# Patient Record
Sex: MALE | Race: WHITE | NOT HISPANIC OR LATINO | Employment: OTHER | URBAN - METROPOLITAN AREA
[De-identification: names, ages, dates, MRNs, and addresses within clinical notes are randomized per-mention and may not be internally consistent; named-entity substitution may affect disease eponyms.]

---

## 2017-11-23 ENCOUNTER — HOSPITAL ENCOUNTER (INPATIENT)
Facility: HOSPITAL | Age: 82
LOS: 2 days | Discharge: HOME HEALTH | DRG: 392 | End: 2017-11-25
Attending: EMERGENCY MEDICINE | Admitting: INTERNAL MEDICINE
Payer: MEDICARE

## 2017-11-23 ENCOUNTER — APPOINTMENT (EMERGENCY)
Dept: RADIOLOGY | Facility: HOSPITAL | Age: 82
DRG: 392 | End: 2017-11-23
Payer: MEDICARE

## 2017-11-23 DIAGNOSIS — K52.9 GASTROENTERITIS: Primary | ICD-10-CM
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
## 2017-11-23 DIAGNOSIS — E86.0 DEHYDRATION: ICD-10-CM

## 2017-11-23 PROBLEM — R50.9 FEVER: Status: ACTIVE | Noted: 2017-11-23

## 2017-11-23 PROBLEM — N17.9 ARF (ACUTE RENAL FAILURE) (HCC): Status: ACTIVE | Noted: 2017-11-23

## 2017-11-23 LAB
ALBUMIN SERPL BCP-MCNC: 3.2 G/DL (ref 3.5–5)
ALP SERPL-CCNC: 65 U/L (ref 46–116)
ALT SERPL W P-5'-P-CCNC: 15 U/L (ref 12–78)
ANION GAP SERPL CALCULATED.3IONS-SCNC: 9 MMOL/L (ref 4–13)
APTT PPP: 29 SECONDS (ref 23–35)
AST SERPL W P-5'-P-CCNC: 18 U/L (ref 5–45)
BACTERIA UR QL AUTO: ABNORMAL /HPF
BASOPHILS # BLD AUTO: 0.01 THOUSANDS/ΜL (ref 0–0.1)
BASOPHILS NFR BLD AUTO: 0 % (ref 0–1)
BILIRUB SERPL-MCNC: 1.02 MG/DL (ref 0.2–1)
BILIRUB UR QL STRIP: NEGATIVE
BUN SERPL-MCNC: 20 MG/DL (ref 5–25)
CALCIUM SERPL-MCNC: 8.5 MG/DL (ref 8.3–10.1)
CHLORIDE SERPL-SCNC: 104 MMOL/L (ref 100–108)
CLARITY UR: CLEAR
CO2 SERPL-SCNC: 26 MMOL/L (ref 21–32)
COLOR UR: YELLOW
COLOR, POC: NORMAL
CREAT SERPL-MCNC: 1.49 MG/DL (ref 0.6–1.3)
EOSINOPHIL # BLD AUTO: 0 THOUSAND/ΜL (ref 0–0.61)
EOSINOPHIL NFR BLD AUTO: 0 % (ref 0–6)
ERYTHROCYTE [DISTWIDTH] IN BLOOD BY AUTOMATED COUNT: 13.7 % (ref 11.6–15.1)
GFR SERPL CREATININE-BSD FRML MDRD: 40 ML/MIN/1.73SQ M
GLUCOSE SERPL-MCNC: 147 MG/DL (ref 65–140)
GLUCOSE UR STRIP-MCNC: NEGATIVE MG/DL
HCT VFR BLD AUTO: 38.5 % (ref 36.5–49.3)
HGB BLD-MCNC: 13.2 G/DL (ref 12–17)
HGB UR QL STRIP.AUTO: ABNORMAL
INR PPP: 1.08 (ref 0.86–1.16)
KETONES UR STRIP-MCNC: NEGATIVE MG/DL
LACTATE SERPL-SCNC: 2 MMOL/L (ref 0.5–2)
LACTATE SERPL-SCNC: 2 MMOL/L (ref 0.5–2)
LACTATE SERPL-SCNC: 3.1 MMOL/L (ref 0.5–2)
LACTATE SERPL-SCNC: 3.8 MMOL/L (ref 0.5–2)
LEUKOCYTE ESTERASE UR QL STRIP: NEGATIVE
LIPASE SERPL-CCNC: 131 U/L (ref 73–393)
LYMPHOCYTES # BLD AUTO: 0.37 THOUSANDS/ΜL (ref 0.6–4.47)
LYMPHOCYTES NFR BLD AUTO: 5 % (ref 14–44)
MAGNESIUM SERPL-MCNC: 1.9 MG/DL (ref 1.6–2.6)
MCH RBC QN AUTO: 32.4 PG (ref 26.8–34.3)
MCHC RBC AUTO-ENTMCNC: 34.3 G/DL (ref 31.4–37.4)
MCV RBC AUTO: 95 FL (ref 82–98)
MONOCYTES # BLD AUTO: 0.66 THOUSAND/ΜL (ref 0.17–1.22)
MONOCYTES NFR BLD AUTO: 9 % (ref 4–12)
NEUTROPHILS # BLD AUTO: 6.44 THOUSANDS/ΜL (ref 1.85–7.62)
NEUTS SEG NFR BLD AUTO: 86 % (ref 43–75)
NITRITE UR QL STRIP: NEGATIVE
NON-SQ EPI CELLS URNS QL MICRO: ABNORMAL /HPF
NRBC BLD AUTO-RTO: 0 /100 WBCS
PH UR STRIP.AUTO: 5.5 [PH] (ref 4.5–8)
PLATELET # BLD AUTO: 253 THOUSANDS/UL (ref 149–390)
PMV BLD AUTO: 10.1 FL (ref 8.9–12.7)
POTASSIUM SERPL-SCNC: 3.5 MMOL/L (ref 3.5–5.3)
PROT SERPL-MCNC: 7.4 G/DL (ref 6.4–8.2)
PROT UR STRIP-MCNC: ABNORMAL MG/DL
PROTHROMBIN TIME: 14 SECONDS (ref 12.1–14.4)
RBC # BLD AUTO: 4.07 MILLION/UL (ref 3.88–5.62)
RBC #/AREA URNS AUTO: ABNORMAL /HPF
SODIUM SERPL-SCNC: 139 MMOL/L (ref 136–145)
SP GR UR STRIP.AUTO: 1.02 (ref 1–1.03)
SPECIMEN SOURCE: NORMAL
TROPONIN I BLD-MCNC: 0.03 NG/ML (ref 0–0.08)
UROBILINOGEN UR QL STRIP.AUTO: 0.2 E.U./DL
WBC # BLD AUTO: 7.5 THOUSAND/UL (ref 4.31–10.16)
WBC #/AREA URNS AUTO: ABNORMAL /HPF

## 2017-11-23 PROCEDURE — 80053 COMPREHEN METABOLIC PANEL: CPT | Performed by: EMERGENCY MEDICINE

## 2017-11-23 PROCEDURE — 83605 ASSAY OF LACTIC ACID: CPT | Performed by: EMERGENCY MEDICINE

## 2017-11-23 PROCEDURE — 94762 N-INVAS EAR/PLS OXIMTRY CONT: CPT

## 2017-11-23 PROCEDURE — 84484 ASSAY OF TROPONIN QUANT: CPT

## 2017-11-23 PROCEDURE — 81002 URINALYSIS NONAUTO W/O SCOPE: CPT | Performed by: EMERGENCY MEDICINE

## 2017-11-23 PROCEDURE — 74177 CT ABD & PELVIS W/CONTRAST: CPT

## 2017-11-23 PROCEDURE — 93005 ELECTROCARDIOGRAM TRACING: CPT | Performed by: EMERGENCY MEDICINE

## 2017-11-23 PROCEDURE — 36415 COLL VENOUS BLD VENIPUNCTURE: CPT | Performed by: EMERGENCY MEDICINE

## 2017-11-23 PROCEDURE — 99285 EMERGENCY DEPT VISIT HI MDM: CPT

## 2017-11-23 PROCEDURE — 83735 ASSAY OF MAGNESIUM: CPT | Performed by: EMERGENCY MEDICINE

## 2017-11-23 PROCEDURE — 81001 URINALYSIS AUTO W/SCOPE: CPT

## 2017-11-23 PROCEDURE — 85025 COMPLETE CBC W/AUTO DIFF WBC: CPT | Performed by: EMERGENCY MEDICINE

## 2017-11-23 PROCEDURE — 70450 CT HEAD/BRAIN W/O DYE: CPT

## 2017-11-23 PROCEDURE — 85730 THROMBOPLASTIN TIME PARTIAL: CPT | Performed by: EMERGENCY MEDICINE

## 2017-11-23 PROCEDURE — 96360 HYDRATION IV INFUSION INIT: CPT

## 2017-11-23 PROCEDURE — 85610 PROTHROMBIN TIME: CPT | Performed by: EMERGENCY MEDICINE

## 2017-11-23 PROCEDURE — 96361 HYDRATE IV INFUSION ADD-ON: CPT

## 2017-11-23 PROCEDURE — 71020 HB CHEST X-RAY 2VW FRONTAL&LATL: CPT

## 2017-11-23 PROCEDURE — 87040 BLOOD CULTURE FOR BACTERIA: CPT | Performed by: EMERGENCY MEDICINE

## 2017-11-23 PROCEDURE — 83690 ASSAY OF LIPASE: CPT | Performed by: EMERGENCY MEDICINE

## 2017-11-23 RX ORDER — TAMSULOSIN HYDROCHLORIDE 0.4 MG/1
0.4 CAPSULE ORAL
Status: DISCONTINUED | OUTPATIENT
Start: 2017-11-24 | End: 2017-11-25 | Stop reason: HOSPADM

## 2017-11-23 RX ORDER — ALLOPURINOL 100 MG/1
100 TABLET ORAL DAILY
Status: DISCONTINUED | OUTPATIENT
Start: 2017-11-24 | End: 2017-11-25 | Stop reason: HOSPADM

## 2017-11-23 RX ORDER — FAMOTIDINE 10 MG
20 TABLET ORAL
Status: ON HOLD | COMMUNITY
End: 2019-01-02 | Stop reason: ALTCHOICE

## 2017-11-23 RX ORDER — FUROSEMIDE 20 MG/1
TABLET ORAL 2 TIMES DAILY
COMMUNITY
End: 2017-11-25 | Stop reason: HOSPADM

## 2017-11-23 RX ORDER — TAMSULOSIN HYDROCHLORIDE 0.4 MG/1
0.4 CAPSULE ORAL
COMMUNITY

## 2017-11-23 RX ORDER — ONDANSETRON 2 MG/ML
4 INJECTION INTRAMUSCULAR; INTRAVENOUS EVERY 6 HOURS PRN
Status: DISCONTINUED | OUTPATIENT
Start: 2017-11-23 | End: 2017-11-25 | Stop reason: HOSPADM

## 2017-11-23 RX ORDER — LOVASTATIN 20 MG/1
20 TABLET ORAL
COMMUNITY

## 2017-11-23 RX ORDER — SODIUM CHLORIDE 9 MG/ML
50 INJECTION, SOLUTION INTRAVENOUS CONTINUOUS
Status: DISCONTINUED | OUTPATIENT
Start: 2017-11-23 | End: 2017-11-24

## 2017-11-23 RX ORDER — ACETAMINOPHEN 325 MG/1
975 TABLET ORAL ONCE
Status: COMPLETED | OUTPATIENT
Start: 2017-11-23 | End: 2017-11-23

## 2017-11-23 RX ORDER — DOXAZOSIN 2 MG/1
1 TABLET ORAL
Status: DISCONTINUED | OUTPATIENT
Start: 2017-11-23 | End: 2017-11-25 | Stop reason: HOSPADM

## 2017-11-23 RX ORDER — ASPIRIN 81 MG/1
81 TABLET ORAL
COMMUNITY

## 2017-11-23 RX ORDER — FAMOTIDINE 20 MG/1
20 TABLET, FILM COATED ORAL DAILY
Status: DISCONTINUED | OUTPATIENT
Start: 2017-11-24 | End: 2017-11-25 | Stop reason: HOSPADM

## 2017-11-23 RX ORDER — ALLOPURINOL 100 MG/1
100 TABLET ORAL
COMMUNITY

## 2017-11-23 RX ORDER — PRAVASTATIN SODIUM 40 MG
40 TABLET ORAL
Status: DISCONTINUED | OUTPATIENT
Start: 2017-11-24 | End: 2017-11-25 | Stop reason: HOSPADM

## 2017-11-23 RX ORDER — HEPARIN SODIUM 5000 [USP'U]/ML
5000 INJECTION, SOLUTION INTRAVENOUS; SUBCUTANEOUS EVERY 8 HOURS SCHEDULED
Status: DISCONTINUED | OUTPATIENT
Start: 2017-11-23 | End: 2017-11-25 | Stop reason: HOSPADM

## 2017-11-23 RX ORDER — ASPIRIN 81 MG/1
81 TABLET ORAL DAILY
Status: DISCONTINUED | OUTPATIENT
Start: 2017-11-24 | End: 2017-11-25 | Stop reason: HOSPADM

## 2017-11-23 RX ORDER — FAMOTIDINE 20 MG/1
20 TABLET, FILM COATED ORAL 2 TIMES DAILY
Status: DISCONTINUED | OUTPATIENT
Start: 2017-11-23 | End: 2017-11-23

## 2017-11-23 RX ORDER — DOXAZOSIN MESYLATE 1 MG/1
2 TABLET ORAL
COMMUNITY

## 2017-11-23 RX ADMIN — ACETAMINOPHEN 975 MG: 325 TABLET, FILM COATED ORAL at 15:33

## 2017-11-23 RX ADMIN — SODIUM CHLORIDE 1000 ML: 0.9 INJECTION, SOLUTION INTRAVENOUS at 17:30

## 2017-11-23 RX ADMIN — SODIUM CHLORIDE 1000 ML: 0.9 INJECTION, SOLUTION INTRAVENOUS at 15:30

## 2017-11-23 RX ADMIN — DOXAZOSIN 1 MG: 2 TABLET ORAL at 22:50

## 2017-11-23 RX ADMIN — IODIXANOL 100 ML: 320 INJECTION, SOLUTION INTRAVASCULAR at 16:39

## 2017-11-23 RX ADMIN — HEPARIN SODIUM 5000 UNITS: 5000 INJECTION, SOLUTION INTRAVENOUS; SUBCUTANEOUS at 22:47

## 2017-11-23 RX ADMIN — SODIUM CHLORIDE 50 ML/HR: 0.9 INJECTION, SOLUTION INTRAVENOUS at 22:44

## 2017-11-23 NOTE — ED ATTENDING ATTESTATION
Carlos Gill MD, saw and evaluated the patient  I have discussed the patient with the resident/non-physician practitioner and agree with the resident's/non-physician practitioner's findings, Plan of Care, and MDM as documented in the resident's/non-physician practitioner's note, except where noted  All available labs and Radiology studies were reviewed  At this point I agree with the current assessment done in the Emergency Department  I have conducted an independent evaluation of this patient a history and physical is as follows:  Here with 2 falls today  Pt rotates where he lives with various family members  Today had diarrhea, and was down on his knees in bathroom  Called ems who got him off floor, pt has been unsteady and weak all day  C/o bilateral knee pain since fall as well  Not eating or drinking well  No cough, no vomiting, denies cp or abdominal pain  ROS limited by pt's deafness  On exam, rectal temp 102 3, tachycardic, tachypneic  Sat adequate  heent perrl, conjunctiva pink, o/p dry  Neck supple and nontender  No m/r/g  cta bilaterally without adventitial sounds  Abdomen soft and nontender  Large inguinal hernia  Abrasions on knees, no rashes  Back normal  Neuro intact  Imp sepsis   Will place iv, fluids, antibiotics, infectious workman    Critical Care Time  CritCare Time

## 2017-11-24 PROBLEM — R50.9 FEVER: Status: RESOLVED | Noted: 2017-11-23 | Resolved: 2017-11-24

## 2017-11-24 LAB
ALBUMIN SERPL BCP-MCNC: 2.5 G/DL (ref 3.5–5)
ALP SERPL-CCNC: 52 U/L (ref 46–116)
ALT SERPL W P-5'-P-CCNC: 12 U/L (ref 12–78)
ANION GAP SERPL CALCULATED.3IONS-SCNC: 7 MMOL/L (ref 4–13)
AST SERPL W P-5'-P-CCNC: 22 U/L (ref 5–45)
ATRIAL RATE: 312 BPM
BILIRUB SERPL-MCNC: 0.68 MG/DL (ref 0.2–1)
BUN SERPL-MCNC: 18 MG/DL (ref 5–25)
C DIFF TOX GENS STL QL NAA+PROBE: NORMAL
CALCIUM SERPL-MCNC: 7.7 MG/DL (ref 8.3–10.1)
CHLORIDE SERPL-SCNC: 111 MMOL/L (ref 100–108)
CO2 SERPL-SCNC: 25 MMOL/L (ref 21–32)
CREAT SERPL-MCNC: 1.09 MG/DL (ref 0.6–1.3)
ERYTHROCYTE [DISTWIDTH] IN BLOOD BY AUTOMATED COUNT: 14.1 % (ref 11.6–15.1)
GFR SERPL CREATININE-BSD FRML MDRD: 59 ML/MIN/1.73SQ M
GLUCOSE SERPL-MCNC: 102 MG/DL (ref 65–140)
GLUCOSE SERPL-MCNC: 93 MG/DL (ref 65–140)
HCT VFR BLD AUTO: 35.7 % (ref 36.5–49.3)
HGB BLD-MCNC: 12.1 G/DL (ref 12–17)
MAGNESIUM SERPL-MCNC: 1.9 MG/DL (ref 1.6–2.6)
MCH RBC QN AUTO: 32 PG (ref 26.8–34.3)
MCHC RBC AUTO-ENTMCNC: 33.9 G/DL (ref 31.4–37.4)
MCV RBC AUTO: 94 FL (ref 82–98)
PHOSPHATE SERPL-MCNC: 2.7 MG/DL (ref 2.3–4.1)
PLATELET # BLD AUTO: 216 THOUSANDS/UL (ref 149–390)
PMV BLD AUTO: 10.2 FL (ref 8.9–12.7)
POTASSIUM SERPL-SCNC: 2.8 MMOL/L (ref 3.5–5.3)
PROT SERPL-MCNC: 6.2 G/DL (ref 6.4–8.2)
QRS AXIS: -8 DEGREES
QRSD INTERVAL: 84 MS
QT INTERVAL: 332 MS
QTC INTERVAL: 378 MS
RBC # BLD AUTO: 3.78 MILLION/UL (ref 3.88–5.62)
SODIUM SERPL-SCNC: 143 MMOL/L (ref 136–145)
T WAVE AXIS: -42 DEGREES
VENTRICULAR RATE: 78 BPM
WBC # BLD AUTO: 5.02 THOUSAND/UL (ref 4.31–10.16)

## 2017-11-24 PROCEDURE — 85027 COMPLETE CBC AUTOMATED: CPT | Performed by: INTERNAL MEDICINE

## 2017-11-24 PROCEDURE — 84100 ASSAY OF PHOSPHORUS: CPT | Performed by: INTERNAL MEDICINE

## 2017-11-24 PROCEDURE — 94762 N-INVAS EAR/PLS OXIMTRY CONT: CPT

## 2017-11-24 PROCEDURE — G8987 SELF CARE CURRENT STATUS: HCPCS

## 2017-11-24 PROCEDURE — 80053 COMPREHEN METABOLIC PANEL: CPT | Performed by: INTERNAL MEDICINE

## 2017-11-24 PROCEDURE — 97163 PT EVAL HIGH COMPLEX 45 MIN: CPT

## 2017-11-24 PROCEDURE — G8988 SELF CARE GOAL STATUS: HCPCS

## 2017-11-24 PROCEDURE — 83735 ASSAY OF MAGNESIUM: CPT | Performed by: INTERNAL MEDICINE

## 2017-11-24 PROCEDURE — 87493 C DIFF AMPLIFIED PROBE: CPT | Performed by: INTERNAL MEDICINE

## 2017-11-24 PROCEDURE — 82948 REAGENT STRIP/BLOOD GLUCOSE: CPT

## 2017-11-24 PROCEDURE — 97166 OT EVAL MOD COMPLEX 45 MIN: CPT

## 2017-11-24 PROCEDURE — G8979 MOBILITY GOAL STATUS: HCPCS

## 2017-11-24 PROCEDURE — G8978 MOBILITY CURRENT STATUS: HCPCS

## 2017-11-24 RX ORDER — POTASSIUM CHLORIDE 20 MEQ/1
40 TABLET, EXTENDED RELEASE ORAL ONCE
Status: COMPLETED | OUTPATIENT
Start: 2017-11-24 | End: 2017-11-24

## 2017-11-24 RX ADMIN — ALLOPURINOL 100 MG: 100 TABLET ORAL at 08:58

## 2017-11-24 RX ADMIN — HEPARIN SODIUM 5000 UNITS: 5000 INJECTION, SOLUTION INTRAVENOUS; SUBCUTANEOUS at 21:09

## 2017-11-24 RX ADMIN — DOXAZOSIN 1 MG: 2 TABLET ORAL at 21:07

## 2017-11-24 RX ADMIN — HEPARIN SODIUM 5000 UNITS: 5000 INJECTION, SOLUTION INTRAVENOUS; SUBCUTANEOUS at 14:15

## 2017-11-24 RX ADMIN — FAMOTIDINE 20 MG: 20 TABLET, FILM COATED ORAL at 08:55

## 2017-11-24 RX ADMIN — PRAVASTATIN SODIUM 40 MG: 40 TABLET ORAL at 16:53

## 2017-11-24 RX ADMIN — HEPARIN SODIUM 5000 UNITS: 5000 INJECTION, SOLUTION INTRAVENOUS; SUBCUTANEOUS at 05:09

## 2017-11-24 RX ADMIN — TAMSULOSIN HYDROCHLORIDE 0.4 MG: 0.4 CAPSULE ORAL at 16:53

## 2017-11-24 RX ADMIN — POTASSIUM CHLORIDE 40 MEQ: 1500 TABLET, EXTENDED RELEASE ORAL at 16:53

## 2017-11-24 RX ADMIN — POTASSIUM CHLORIDE 40 MEQ: 1500 TABLET, EXTENDED RELEASE ORAL at 10:41

## 2017-11-24 RX ADMIN — ASPIRIN 81 MG: 81 TABLET, COATED ORAL at 08:55

## 2017-11-24 NOTE — SEPSIS NOTE
Sepsis Note   Jyoti Thompson 80 y o  male MRN: [id number]  Unit/Bed#: [facility] 427-02 Encounter: [id number]            Initial Sepsis Screening     Row Name 11/23/17 1605                Is the patient's history suggestive of a new or worsening infection? (!)  Yes (Proceed)  -BW        Suspected source of infection suspect infection, source unknown  -BW        Are two or more of the following signs & symptoms of infection both present and new to the patient? (!)  Yes (Proceed)  -BW        Indicate SIRS criteria Tachypnea > 20 resp per min; Hyperthemia > 38 3C (100 9F)  -BW        If the answer is yes to both questions, suspicion of sepsis is present          If severe sepsis is present AND tissue hypoperfusion perists in the hour after fluid resuscitation or lactate > 4, the patient meets criteria for SEPTIC SHOCK          Are any of the following organ dysfunction criteria present within 6 hours of suspected infection and SIRS criteria that are NOT considered to be chronic conditions? (!)  Yes  -BW        Organ dysfunction Lactate > 2 0 mmol/L  -BW        Date of presentation of severe sepsis 11/23/17  -BW        Time of presentation of severe sepsis 1605  -BW        Tissue hypoperfusion persists in the hour after crystalloid fluid administration, evidenced, by either:          Was hypotension present within one hour of the conclusion of crystalloid fluid administration?         Date of presentation of septic shock          Time of presentation of septic shock            User Key  (r) = Recorded By, (t) = Taken By, (c) = Cosigned By    [address] Name Provider Type     Mich Kiser MD Physician        Sepsis reassessment was at 1815 hours, not 2149 as erroneously recorded         Default Flowsheet Data (last 720 hours)      Sepsis Reassessment     Row Name 11/23/17 2149                   Volume Status and Tissue Perfusion Post Fluid Resuscitation- Must Document ALL of the Following:    Vital Signs Reviewed Yes  -BW        Cardio Normal S1/S2; Regular rate and rhythm  -BW        Pulmonary Normal effort;Clear to auscultation  -BW        Capillary Refill Brisk  -BW        Peripheral Pulses Radial  -BW        Peripheral Pulse +2  -BW        Skin Warm;Dry;Normal  -BW           *OR*   Intensive Monitoring- Must Document Two * of the Following Four *:    Vital Signs Reviewed          * Central Venous Pressure (CVP or RAP)          * Central Venous Oxygen (SVO2, ScvO2 or Oxygen saturation via central catheter)          * Bedside Cardiovascular US in IVC diameter and % collapse          * Passive Leg Raise OR Crystalloid Challenge            User Key  (r) = Recorded By, (t) = Taken By, (c) = Cosigned By    Initials Name Provider Type    BW Clotilde Beltrán MD Physician

## 2017-11-24 NOTE — PROGRESS NOTES
Rounded with Dr Thera Homans  Plan to d/c after c diff sample results  Asked if pt could be taken off of SD, stated she will evaluate  Will continue to monitor

## 2017-11-24 NOTE — PROGRESS NOTES
[facility] Internal Medicine Progress Note  Patient: Dalia Jarquin 80 y o  male   MRN: [id number]  PCP: Mary Jane Medrano MD  Unit/Bed#: [facility] 620-86 Encounter: [id number]  Date Of Visit: 17    Assessment/Plan:  ·   Principal Problem:    Fever  Active Problems:    ARF (acute renal failure) ([address] )    Gastroenteritis    Diarrheal illness : ?  Gastroenteritis  Afebrile, BP controlled, corrected calcium -8 9mg/dL- normal  -C  Dif testing-today to exclude infection  -resolved  ARF:  Resolved  BUN creatinine  09  Stop  IV fluid hydration; to avoid fluid overload  Replete potassium- repeat BMP tomorrow      Inguinal scrotal hernia bilaterally  -clinically unremarkable; no evidence of strangulation  -follow up outpatient    Recurrent falls/ambulatory dysfunction  -PT/OT     Hyperlipidemia-continue stat  BPH-continue Flomax  Gout:  Continue appears    VTE Pharmacologic Prophylaxis:   Pharmacologic: Heparin  Mechanical VTE Prophylaxis in Place: Yes    Patient Centered Rounds: I have performed bedside rounds with nursing staff today  Education and Discussions with Family / Patient: patient    Current Length of Stay: 1 day(s)    Current Patient Status: Inpatient   Certification Statement: The patient will continue to require additional inpatient hospital stay due to medical management    Discharge Plan:     Code Status: Level 1 - Full Code      Subjective:   No complaints today  ROS negative for headaches, fever, chest pain, shortness of breath, nausea, vomiting, constipation or abdominal pain  Objective:     Vitals:   Temp (24hrs), Av 4 °F (37 4 °C), Min:97 7 °F (36 5 °C), Max:102 3 °F (39 1 °C)    HR:  [71-92] 77  Resp:  [18-25] 18  BP: (107-154)/(54-75) 107/54  SpO2:  [92 %-98 %] 92 %  Body mass index is 37 05 kg/m²  Input and Output Summary (last 24 hours):        Intake/Output Summary (Last 24 hours) at 17 1010  Last data filed at 17 0227   Gross per 24 hour   Intake                0 ml Output              225 ml   Net             -225 ml       Physical Exam:  General Appearance: Morbidly obese, alert, cooperative, no distress, appropriately responsive    Head:    Normocephalic, without obvious abnormality, atraumatic, mucous membranes moist    Eyes:    Conjunctiva /corneas clear, EOM's intact   Neck:   Supple   Lungs:     Clear to auscultation bilaterally, respirations unlabored, no crackles or wheeze heard    Heart:    Regular rate and rhythm, S1 and S2 no murmur   Abdomen:     midly distended, non-tender, bowel sounds active all four quadrants, Bilateral inguin-scrotal hernias- bowel sounds present, since 1975   Extremities:   Extremities normal, atraumatic, no cyanosis or edema   Neurologic:  nonfocal         Additional Data:     Labs:      Results from last 7 days  Lab Units 11/24/17  0530 11/23/17  1527   WBC Thousand/uL 5 02 7 50   HEMOGLOBIN g/dL 12 1 13 2   HEMATOCRIT % 35 7* 38 5   PLATELETS Thousands/uL 216 253   NEUTROS PCT %  --  86*   LYMPHS PCT %  --  5*   MONOS PCT %  --  9   EOS PCT %  --  0       Results from last 7 days  Lab Units 11/24/17  0530   SODIUM mmol/L 143   POTASSIUM mmol/L 2 8*   CHLORIDE mmol/L 111*   CO2 mmol/L 25   BUN mg/dL 18   CREATININE mg/dL 1 09   CALCIUM mg/dL 7 7*   TOTAL PROTEIN g/dL 6 2*   BILIRUBIN TOTAL mg/dL 0 68   ALK PHOS U/L 52   ALT U/L 12   AST U/L 22   GLUCOSE RANDOM mg/dL 102       Results from last 7 days  Lab Units 11/23/17  1527   INR  1 08       * I Have Reviewed All Lab Data Listed Above  * Additional Pertinent Lab Tests Reviewed:  [address] Admission Reviewed    Cultures:   Blood Culture: No results found for: BLOODCX  Urine Culture: No results found for: URINECX  Sputum Culture: No components found for: SPUTUMCX  Wound Culture: No results found for: WOUNDCULT    Last 24 Hours Medication List:     allopurinol 100 mg Oral Daily   aspirin 81 mg Oral Daily   doxazosin 1 mg Oral HS   famotidine 20 mg Oral Daily heparin (porcine) 5,000 Units Subcutaneous Q8H [address]   pravastatin 40 mg Oral Daily With Dinner   tamsulosin 0 4 mg Oral Daily With Dinner        Today, Patient Was Seen By: Sarina Isaacs MD    ** Please Note: Dragon 360 Dictation voice to text software may have been used in the creation of this document   **

## 2017-11-24 NOTE — H&P
History and Physical - [facility]    Patient Information: Kaylah Salguero 80 y o  male MRN: [id number]  Unit/Bed#: [facility] Encounter: [id number]  Admitting Physician: Dasia Gonzales DO  PCP: Modesta Shields MD  Date of Admission:  11/23/17    Assessment/Plan:    Hospital Problem List:     Principal Problem:    Fever  Active Problems:    ARF (acute renal failure) ([address] )    Gastroenteritis      Plan for the Primary Problem(s):  · Febrile illness secondary to probable gastroenteritis  · Continue supportive care  · Fluid status station  · Stool for C diff  · Physical therapy/occupational therapy    Plan for Additional Problems:   · Acute renal failure secondary to dehydration  Continue fluid resuscitation  Monitor creatinine  Unclear baseline  Outpatient labs in 2012 showed creatinine 1  · Chronic hernia bilateral   Outpatient follow-up  · Hyperlipidemia-statin  · BPH-Flomax  · Gout-allopurinol    VTE Prophylaxis: Heparin  / sequential compression device   Code Status:  Full code for now  POLST: There is no POLST form on file for this patient (pre-hospital)    Anticipated Length of Stay:  Patient will be admitted on an Inpatient basis with an anticipated length of stay of  greater than 2 midnights  Justification for Hospital Stay:  Needs further evaluation of renal function and hydration status  Total Time for Visit, including Counseling / Coordination of Care: 45 minutes  Greater than 50% of this total time spent on direct patient counseling and coordination of care  Chief Complaint:   Weakness with recurrent falls    History of Present Illness:    Kaylah Salguero is a 80 y o  male who presents with evaluation for recurrent falls  The patient reportedly has been having worsening symptoms of gait dysfunction  Patient was noted to have increasing symptoms of diarrhea with multiple bowel movements a day  The patient denies any nausea vomiting currently    He presents to the hospital with a 102 fever   The patient's daughter reports they had starting having diarrhea starting at approximately 2:00 a m  earlier today  The patient presents with a profound sense of weakness and decreased ability to ambulate  Review of Systems:    Review of Systems   Constitutional: Positive for chills and fever  Respiratory: Negative for cough and shortness of breath  Gastrointestinal: Positive for diarrhea and nausea  Negative for blood in stool  Psychiatric/Behavioral: Negative for agitation  All other systems reviewed and are negative  Past Medical and Surgical History:     Past Medical History:   Diagnosis Date    Atrial fibrillation ([facility] )     Stroke [facility])        Past Surgical History:   Procedure Laterality Date    BACK SURGERY      CARDIAC SURGERY      pacemaker placement        Meds/Allergies:    Prior to Admission medications    Medication Sig Start Date End Date Taking? Authorizing Provider   allopurinol (ZYLOPRIM) 100 mg tablet Take by mouth daily     Yes Historical Provider, MD   aspirin (ECOTRIN LOW STRENGTH) 81 mg EC tablet Take 81 mg by mouth daily   Yes Historical Provider, MD   doxazosin (CARDURA) 1 mg tablet Take by mouth daily at bedtime     Yes Historical Provider, MD   famotidine (PEPCID) 10 mg tablet Take by mouth 2 (two) times a day     Yes Historical Provider, MD   furosemide (LASIX) 20 mg tablet Take by mouth 2 (two) times a day     Yes Historical Provider, MD   lovastatin (MEVACOR) 10 MG tablet Take by mouth daily at bedtime     Yes Historical Provider, MD   tamsulosin (FLOMAX) 0 4 mg Take by mouth daily with dinner     Yes Historical Provider, MD     I have reviewed home medications with patient personally      Allergies: No Known Allergies    Social History:     Marital Status: Single   Occupation:  Home  Patient Pre-hospital Diet Restrictions:  Denies  Substance Use History:   History   Alcohol Use No     History   Smoking Status    Never Smoker   Smokeless Tobacco    Never Used History   Drug Use No       Family History:    History reviewed  No pertinent family history  Physical Exam:     Vitals:   Blood Pressure: 122/61 (11/23/17 1900)  Pulse: 74 (11/23/17 1900)  Temperature: 98 8 °F (37 1 °C) (11/23/17 1730)  Temp Source: Oral (11/23/17 1730)  Respirations: (!) 25 (11/23/17 1900)  Height: 5' 7" (170 2 cm) (11/23/17 1450)  Weight - Scale: 104 kg (230 lb) (11/23/17 1450)  SpO2: 93 % (11/23/17 1900)    Physical Exam   Constitutional: He is oriented to person, place, and time  He appears well-developed and well-nourished  HENT:   Head: Normocephalic and atraumatic  Eyes: Conjunctivae are normal  Pupils are equal, round, and reactive to light  Neck: Normal range of motion  Neck supple  No JVD present  No tracheal deviation present  No thyromegaly present  Pulmonary/Chest: Effort normal and breath sounds normal  No respiratory distress  He has no wheezes  He has no rales  Abdominal: Soft  Bowel sounds are normal    Musculoskeletal: Normal range of motion  He exhibits no edema or deformity  Neurological: He is alert and oriented to person, place, and time  Skin: Skin is warm and dry  No erythema  Psychiatric: He has a normal mood and affect  Additional Data:     Lab Results: I have personally reviewed pertinent reports          Results from last 7 days  Lab Units 11/23/17  1527   WBC Thousand/uL 7 50   HEMOGLOBIN g/dL 13 2   HEMATOCRIT % 38 5   PLATELETS Thousands/uL 253   NEUTROS PCT % 86*   LYMPHS PCT % 5*   MONOS PCT % 9   EOS PCT % 0       Results from last 7 days  Lab Units 11/23/17  1527   SODIUM mmol/L 139   POTASSIUM mmol/L 3 5   CHLORIDE mmol/L 104   CO2 mmol/L 26   BUN mg/dL 20   CREATININE mg/dL 1 49*   CALCIUM mg/dL 8 5   TOTAL PROTEIN g/dL 7 4   BILIRUBIN TOTAL mg/dL 1 02*   ALK PHOS U/L 65   ALT U/L 15   AST U/L 18   GLUCOSE RANDOM mg/dL 147*       Results from last 7 days  Lab Units 11/23/17  1527   INR  1 08       Imaging: I have personally reviewed pertinent reports  Xr Chest Pa & Lateral    Result Date: 11/23/2017  Narrative: CHEST INDICATION:  Sepsis  Fall  COMPARISON:  None VIEWS:  Frontal and lateral projections IMAGES:  3 FINDINGS:     Single lead pacer, grossly within normal limits  Distal portion of the lead poorly demonstrated due to body habitus and underpenetration  The cardiac silhouette is moderately enlarged, limited evaluation due to positioning and technique  Mediastinal structures remain midline and grossly within normal limits  Limited inspiratory volume  RIGHT lung grossly aerated  Mild hazy opacity at the LEFT lung base, unclear if this is artifactual due to overlying soft tissue tissue attenuation  No definite pleural fluid collections  No pneumothorax  Bony thorax remarkable for plate and screw fixation along the lateral aspect of several RIGHT ribs  Orthopedic hardware also seen throughout the mid to lower lumbar spine no gross fractures appreciated on this limited exam      Impression: Limited as above  Cardiomegaly  Mild hazy opacity at the LEFT lung base as above  Workstation performed: [id number]     Ct Head Wo Contrast    Result Date: 11/23/2017  Narrative: CT BRAIN - WITHOUT CONTRAST INDICATION:  Fall this a m  COMPARISON:  None  TECHNIQUE:  CT examination of the brain was performed  In addition to axial images, coronal reformatted images were created and submitted for interpretation  Radiation dose length product (DLP) for this visit:  1057 82 mGy-cm   This examination, like all CT scans performed in the [facility], was performed utilizing techniques to minimize radiation dose exposure, including the use of iterative reconstruction and automated exposure control  IMAGE QUALITY:  Multiple images degraded by mild patient motion  FINDINGS:  PARENCHYMA:  Decreased attenuation is noted in the supratentorial white matter demonstrating an appearance most consistent with mild microangiopathic change   No intracranial mass, mass effect or midline shift  No CT signs of acute infarction  There is no parenchymal hemorrhage  VENTRICLES AND EXTRA-AXIAL SPACES:  Enlargement of ventricles and extra-axial CSF spaces consistent with cerebral atrophy  VISUALIZED ORBITS AND PARANASAL SINUSES:  Unremarkable  CALVARIUM AND EXTRACRANIAL SOFT TISSUES:   Normal      Impression: No acute intracranial abnormality  Microangiopathic changes  Atrophy  Workstation performed: [id number]     Ct Abdomen Pelvis W Contrast    Result Date: 11/23/2017  Narrative: CT ABDOMEN AND PELVIS WITH IV CONTRAST INDICATION:  Recent falls  Diarrhea  Elevated lactic acid  COMPARISON: None  TECHNIQUE:  CT examination of the abdomen and pelvis was performed  Reformatted images were created in axial, sagittal, and coronal planes  Radiation dose length product (DLP) for this visit:  1207 18 mGy-cm   This examination, like all CT scans performed in the [facility], was performed utilizing techniques to minimize radiation dose exposure, including the use of iterative reconstruction and automated exposure control  IV Contrast:  100 mL of iodixanol (VISIPAQUE)     Enteric Contrast:  Enteric contrast was not administered  FINDINGS: ABDOMEN LOWER CHEST:  Calcified 7 mm nodule in the RIGHT lower lobe likely representing granuloma  Minimal linear subpleural lines in both lung bases suggesting mild interstitial fibrotic changes  No acute consolidation evident  Mild hiatal hernia  LIVER/BILIARY TREE:  Unremarkable  GALLBLADDER:  No calcified gallstones  No pericholecystic inflammatory change  SPLEEN:  Unremarkable  PANCREAS:  Unremarkable  ADRENAL GLANDS:  Unremarkable  KIDNEYS/URETERS:  Mild generalized renal atrophy  No hydronephrosis  Symmetric enhancement, within normal limits  STOMACH AND BOWEL:  Stomach and small bowel within normal limits  Fluid content diffusely throughout the colon consistent with presenting history of diarrhea  No acute inflammatory thickening or evidence of obstruction  Note is made of bilateral large  inguinal hernias, on the RIGHT containing nonobstructed loop of small bowel and on the LEFT containing distal descending colon  Both of these incompletely visualized  APPENDIX:  A normal appendix was visualized  ABDOMINOPELVIC CAVITY:  No ascites or free intraperitoneal air  No lymphadenopathy  VESSELS:  Unremarkable for patient's age  PELVIS REPRODUCTIVE ORGANS:  Unremarkable for patient's age  URINARY BLADDER:  Bladder is only mildly distended  There is generalized bladder wall thickening, slightly asymmetric on the RIGHT of uncertain significance  Cannot better evaluated on this exam due to underdistention  No discrete enhancing mass  No calculi  ABDOMINAL WALL/INGUINAL REGIONS:  Unremarkable  OSSEOUS STRUCTURES:  Diffuse multilevel degenerative changes seen throughout the lumbar spine  Prior posterior fusion in the lower thoracic spine traversing from T7 through T11  Prior fracture seen at T9  Hardware appears grossly intact  No evidence for acute malalignment demonstrated  No new fractures evident  Impression: Diffuse fluid content throughout the colon, no focal inflammatory changes or evidence of obstruction demonstrated  Incompletely visualized large bilateral inguinal hernias containing small bowel on the RIGHT and: On the LEFT  Hiatal hernia  Bladder wall thickening, mild asymmetric thickened on the RIGHT of uncertain significance, limited evaluation given mild distention  Can be related to chronic outlet obstructive changes  Workstation performed: [id number]     [employer] Records Reviewed: No     ** Please Note: This note has been constructed using a voice recognition system   **

## 2017-11-24 NOTE — SOCIAL WORK
CM met with Pt and also was in contact with his niece/POA Jack Danielson [phone] with an introduction and explanation of role  Pt reported residing with niece Jack Danielson for half the year in a ranch style home with the current use of a cane but also has a roller walker he can use if needed and 1 step to enter the home  Pt reported being independent with ADLs, had [facility]  in the past along with another [facility]  agency in [place] which he can't remember the name  [facility] the [facility]  Pt denied any mental health or drug/alcohol placements  Pt reported using [facility] on [address]  Family to transport upon d/c     CM reviewed d/c planning process including the following: identifying help at home, patient preference for d/c planning needs, Discharge Lounge, [facility] Meds to Bed program, availability of treatment team to discuss questions or concerns patient and/or family may have regarding understanding medications and recognizing signs and symptoms once discharged  CM also encouraged patient to follow up with all recommended appointments after discharge  Patient advised of importance for patient and family to participate in managing patients medical well being

## 2017-11-24 NOTE — SEPSIS NOTE
Sepsis Note   Salvatore Carroll 80 y o  male MRN: [id number]  Unit/Bed#: [facility] 610-86 Encounter: [id number]            Initial Sepsis Screening     Row Name 11/23/17 1605                Is the patient's history suggestive of a new or worsening infection? (!)  Yes (Proceed)  -BW        Suspected source of infection suspect infection, source unknown  -BW        Are two or more of the following signs & symptoms of infection both present and new to the patient? (!)  Yes (Proceed)  -BW        Indicate SIRS criteria Tachypnea > 20 resp per min; Hyperthemia > 38 3C (100 9F)  -BW        If the answer is yes to both questions, suspicion of sepsis is present          If severe sepsis is present AND tissue hypoperfusion perists in the hour after fluid resuscitation or lactate > 4, the patient meets criteria for SEPTIC SHOCK          Are any of the following organ dysfunction criteria present within 6 hours of suspected infection and SIRS criteria that are NOT considered to be chronic conditions? (!)  Yes  -BW        Organ dysfunction Lactate > 2 0 mmol/L  -BW        Date of presentation of severe sepsis 11/23/17  -BW        Time of presentation of severe sepsis 1605  -BW        Tissue hypoperfusion persists in the hour after crystalloid fluid administration, evidenced, by either:          Was hypotension present within one hour of the conclusion of crystalloid fluid administration?           Date of presentation of septic shock          Time of presentation of septic shock            User Key  (r) = Recorded By, (t) = Taken By, (c) = Cosigned By    [address] Name Provider Type    BW Kal Abdi MD Physician

## 2017-11-24 NOTE — OCCUPATIONAL THERAPY NOTE
[address]  Evaluation     Patient Name: Jeannette Dewey  IYQVN'O Date: 11/24/2017  Problem List  Patient Active Problem List   Diagnosis    Fever    [facility] failure) [facility] [address] )    Gastroenteritis     Past Medical History  Past Medical History:   Diagnosis Date    Atrial fibrillation ([facility] [address] )     Stroke [facility])      Past Surgical History  Past Surgical History:   Procedure Laterality Date    BACK SURGERY      CARDIAC SURGERY      pacemaker placement       11/24/17 1115   Note Type   Note type Eval only   Restrictions/Precautions   Weight Bearing Precautions Per Order No   Other Precautions Fall Risk;Contact/isolation   Pain Assessment   Pain Assessment No/denies pain   Pain Score No Pain   Home Living   Type of Home House   Home Layout Performs ADLs on one level; One level;Stairs to enter without rails  (1 ALISSON)   Bathroom Shower/Tub Walk-in shower   Bathroom Toilet Standard   Bathroom Equipment Shower chair   Home Equipment Walker;Cane   Prior Function   Level of [place] Needs assistance with ADLs and functional mobility; Needs assistance with IADLs   Lives With Family  [facility] and  +children)   Receives Help From Family   ADL Assistance Needs assistance   IADLs Needs assistance   Falls in the last 6 months 1 to 4   Vocational Retired   Lifestyle   Autonomy Pt reports that he requires Assist candy dressing and bathing at baseline from thanh  Pt reports showering in walk in shower w/ shower chair 2x weekly   Reciprocal Relationships Pt reports supportive thanh and    Jona Go is home 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   [facility] Pt reports that he enjoys playing with kids and watching tv   Psychosocial   Psychosocial (WDL) WDL   Subjective   Subjective "It happened so fast, I was down and I couldnt get up "   ADL   Where Assessed Edge of bed   Eating Assistance 5  Supervision/Setup Grooming Assistance 5  Supervision/Setup   UB Bathing Assistance 5  Supervision/Setup   LB Bathing Assistance 2  Maximal Assistance   UB Dressing Assistance 5  Supervision/Setup   LB Dressing Assistance 3  Moderate Assistance   Toileting Assistance  3  Moderate Assistance   Functional Assistance 3  Moderate Assistance   Functional Deficit Setup;Steadying   Additional Comments Pt is limited by decreased endurance, activity toelrance, decreased standing balance, poor dynamic LB reaching, HUNTER/SOB (SPO2 WFL during session), poor bed mobilty  Transfers   Sit to Stand 3  Moderate assistance   Stand to Sit 4  Minimal assistance   Stand pivot 4  Minimal assistance   Functional Mobility   Functional Mobility 4  Minimal assistance   Additional Comments w/ RW   Balance   Static Sitting Fair   Dynamic Sitting Fair   Static Standing Fair   Dynamic Standing Fair -   Activity Tolerance   Activity Tolerance Patient limited by fatigue   Medical Staff Made Aware DUNIA christian   RUE Assessment   RUE Assessment WFL   LUE Assessment   LUE Assessment WFL   Hand Function   Gross Motor Coordination Functional   Fine Motor Coordination Functional   Sensation   Light Touch No apparent deficits   Sharp/Dull No apparent deficits   Stereognosis No apparent deficits   Cognition   Overall Cognitive Status Impaired   Arousal/Participation Alert; Cooperative   Attention Attends with cues to redirect   Orientation Level Oriented to person;Oriented to place;Oriented to situation   Memory Decreased recall of precautions;Decreased recall of recent events;Decreased short term memory   Following Commands Follows one step commands with increased time or repetition   Assessment   Limitation Decreased ADL status; Decreased endurance;Decreased high-level ADLs; Decreased self-care trans   Prognosis Fair   Assessment Pt is a 80 y o  male seen for OT evaluation s/p [facility] on 11/23/2017 w/ Fever  Pt has presented w/ Diarrhea   Comorbidities affecting pt's functional performance at time of assessment include: obesity , ARF  Personal factors affecting pt at time of IE include:limited home support and difficulty performing ADLS  Prior to admission, Required assist for ADLs, IADLs  Upon evaluation: Pt requires MOD/MAX A for LB ALDS at this time 2* the following deficits impacting occupational performance: decreased ROM, decreased strength, decreased balance and decreased tolerance, frequent falls  Pt to benefit from continued skilled OT tx while in the hospital to address deficits as defined above and maximize level of functional independence w ADL's and functional mobility  Occupational Performance areas to address include: grooming, bathing/shower, toilet hygiene and dressing  From OT standpoint  Recommend short term inpatient rehab VS Home with family support and assistance if able when medically stable  Will continue to follow 3-5 x weekly to address the following below stated goals  Plan   Treatment Interventions ADL retraining;Functional transfer training; Endurance training   Goal Expiration Date 12/04/17   Treatment Day (EVAL)   OT Frequency 3-5x/wk   Recommendation   OT Discharge Recommendation 24 hour supervision/assist  (Home with family support, VS Inpt rehab)   Barthel Index   Feeding 5   Bathing 0   Grooming Score 0   Dressing Score 5   Bladder Score 5   Bowels Score 5   Toilet Use Score 5   Transfers (Bed/Chair) Score 10   Mobility (Level Surface) Score 0   Stairs Score 0   Barthel Index Score 35   1) Pt will increase bed mobility to SBA and transfer EOB to participate in functional activity with G tolerance and balance  2) Pt will improve functional transfers to SBA on/off all surfaces using DME PRN w/ G balance/safety including toileting  3) Pt will complete Shower/bathe routine w/ Supervision sitting/standing as tolerated w/ appropriate use of AE and VC's for safety as needed       4) Pt will complete dressing routine w/ Supervision sitting/standing as tolerated w/ appropriate use of AE and VC's for safety as needed  5) Pt will complete toileting w/ SBA and VC's for safety as needed w/ G hygiene/thoroughness using DME PRN  6) Pt will improve activity tolerance to G for min 30 min txment sessions  7) Pt will participate in light grooming task with SBA using setup standing at sink ~3-5mins with G safety and balance  8) Pt will engage in ongoing cognitive assessment(S) w/ G participation to A w/ safe d/c planning/recommendations

## 2017-11-24 NOTE — PLAN OF CARE
Problem: PHYSICAL THERAPY ADULT  Goal: Performs mobility at highest level of function for planned discharge setting  See evaluation for individualized goals  Treatment/Interventions: Functional transfer training, LE strengthening/ROM, Elevations, Therapeutic exercise, Gait training, Bed mobility, Equipment eval/education, OT  Equipment Recommended: Walker (PATIENT HAS RW AT HOME)       See flowsheet documentation for full assessment, interventions and recommendations  Shelley Galeazzi, PT    Prognosis: Good  Problem List: Decreased strength, Decreased endurance, Impaired judgement, Decreased safety awareness, Impaired balance, Decreased mobility, Decreased cognition  Assessment: PT COMPLETED EVALUATION OF 80YEAR OLD MALE ADMITTED TO [facility] FOR EVALUATION OF RECURRENT FALLS  UPON ADMISSION PATIENT FEBRILE  DIAGNOSES INCLUDE DIARRHEAL ILLNESS (R/O C  DIF), GASTROENTERITIS, AND ARF  CURRENT MEDICAL AND PHYSICAL INSTABILITIES INCLUDE CONTACT PRECAUTIONS, FALLS RISK, AND A REGRESSION IN FUNCTIONAL STATUS FROM BASELINE  PMH IS SIGNIFICANT FOR AFIB, CVA, BACK SURGERY, FALLS, AND PACEMAKER PLACEMENT  PRIOR TO THIS ADMISSION PATIENT RESIDED IN [facility] WITH HIS NIECE, HER , AND THEIR CHILDREN WHERE HE REPORTS PRIOR I WITH MOBILITY (SPC)  FAMILY ASSISTS WITH ADLS  AND IADLS PRN  CURRENT IMPAIRMENTS INCLUDE REDUCED B/L LE STRENGTH, ACTIVITY TOLERANCE, BALANCE, AND LIMITED PARTICIPATION IN AMBULATION  DURING PT EVALUATION PATIENT REQUIRED S W/ INCREASED TIME FOR SIT<-->STAND TRANSFER AND SHORT DISTANCE AMBULATION  PATIENT AMBULATED 10 FEET X 2 W/ RW PRESENTING WITH REDUCED GAIT SPEED  AT THIS POINT IN TIME RECOMMEND D/C HOME W/ FAMILY ASSIST PRN, USE OF RW, AND HOME PT  IF FAMILY UNABLE TO PROVIDE ASSISTANCE PRN RECOMMEND D/C TO STR  PATIENT WILL BENEFIT FROM CONTINUED SKILLED INPT PT THIS ADMISSION TO ACHIEVE MAXIMAL FUNCTION AND SAFETY     Barriers to Discharge: Decreased caregiver support  Barriers to Discharge Comments: PATIENT REPORTS HOME ALONE FOR PARTS OF THE DAY   Recommendation: (S) Home with family support, Home PT (VS STR IF FAMILY UNABLE TO PROVIDE ASSISTANCE)     PT - OK to Discharge: (S) Yes (HOME, FAMILY SUPPORT, RW, HOME PT WHEN MED CHARLY )    See flowsheet documentation for full assessment     Papa Kerr, PT

## 2017-11-24 NOTE — PHYSICAL THERAPY NOTE
Physical Therapy Evaluation    Patient's Name: Dalia Jarquin    Admitting Diagnosis  Dehydration [E86 0]  Gastroenteritis [K52 9]  Knee injury [S89 90XA]    Problem List  Patient Active Problem List   Diagnosis    Fever    [facility] failure) [facility] [address] )    Gastroenteritis       Past Medical History  Past Medical History:   Diagnosis Date    Atrial fibrillation ([facility] [address] )     Stroke [facility])        Past Surgical History  Past Surgical History:   Procedure Laterality Date    BACK SURGERY      CARDIAC SURGERY      pacemaker placement       11/24/17 1513   Note Type   Note type Eval only   Pain Assessment   Pain Assessment No/denies pain   Pain Score No Pain   Home Living   Type of [address] to live on main level with bedroom/bathroom; Performs ADLs on one level;Stairs to enter with rails  (1 ALISSON)   Bathroom Shower/Tub Walk-in shower   Bathroom Toilet Standard   Bathroom Equipment Shower chair   Home Equipment Walker;Cane   Prior Function   Level of [place] Needs assistance with IADLs; Needs assistance with ADLs and functional mobility   Lives With Family  ([facility]a Curtis, [facility], CHILDREN )   [address] Help From Family   ADL Assistance Needs assistance   IADLs Needs assistance   Falls in the last 6 months 1 to 4   Vocational Retired   Restrictions/Precautions   [place] Wade Bearing Precautions Per Order No   Braces or Orthoses (NONE)   Other Precautions Fall Risk;Contact/isolation;Multiple lines   General   Family/Caregiver Present No   Cognition   Overall Cognitive Status Impaired   Attention Attends with cues to redirect   Orientation Level Oriented to person;Oriented to place;Oriented to situation;Oriented to time   Memory Decreased recall of precautions;Decreased recall of recent events;Decreased short term memory   Following Commands Follows one step commands with increased time or repetition   RUE Assessment   RUE Assessment WFL   LUE Assessment   LUE Assessment WFL   RLE Assessment   RLE Assessment WFL  (4-/5 (GROSSLY))   LLE Assessment   LLE Assessment WFL  (4-/5 (GROSSLY))   Bed Mobility   Supine to Sit Unable to assess   Sit to Supine Unable to assess   Transfers   Sit to Stand 5  Supervision   Additional items Increased time required   Stand to Sit 5  Supervision   Additional items Increased time required   Ambulation/Elevation   Gait pattern Excessively slow; Shuffling;Decreased foot clearance   Gait Assistance 5  Supervision   Assistive Device Rolling walker   Distance 10 FEET X2   Balance   Static Sitting Fair   Static Standing Fair   Ambulatory Fair -   Endurance Deficit   Endurance Deficit Yes   Endurance Deficit Description BODY HABITUS; WEAKNESS; FATIGUE   Activity Tolerance   Activity Tolerance Patient limited by fatigue   Nurse Made Aware CHARLY TO SEE PER RN    Assessment   Prognosis Good   Problem List Decreased strength;Decreased endurance; Impaired judgement;Decreased safety awareness; Impaired balance;Decreased mobility; Decreased cognition   Assessment PT COMPLETED EVALUATION OF 80YEAR OLD MALE ADMITTED TO [facility] FOR EVALUATION OF RECURRENT FALLS  UPON ADMISSION PATIENT FEBRILE  DIAGNOSES INCLUDE DIARRHEAL ILLNESS (R/O C  DIF), GASTROENTERITIS, AND ARF  CURRENT MEDICAL AND PHYSICAL INSTABILITIES INCLUDE CONTACT PRECAUTIONS, FALLS RISK, AND A REGRESSION IN FUNCTIONAL STATUS FROM BASELINE  PMH IS SIGNIFICANT FOR AFIB, CVA, BACK SURGERY, FALLS, AND PACEMAKER PLACEMENT  PRIOR TO THIS ADMISSION PATIENT RESIDED IN [facility] [place] [facility] ([facility]) WITH HIS NIECE, HER , AND THEIR CHILDREN WHERE HE REPORTS PRIOR I WITH MOBILITY (SPC)  FAMILY ASSISTS WITH ADLS  AND IADLS PRN  CURRENT IMPAIRMENTS INCLUDE REDUCED B/L LE STRENGTH, ACTIVITY TOLERANCE, BALANCE, AND LIMITED PARTICIPATION IN AMBULATION  DURING PT EVALUATION PATIENT REQUIRED S W/ INCREASED TIME FOR SIT<-->STAND TRANSFER AND SHORT DISTANCE AMBULATION  PATIENT AMBULATED 10 FEET X 2 W/ RW PRESENTING WITH REDUCED GAIT SPEED   AT THIS POINT IN TIME RECOMMEND D/C HOME W/ FAMILY ASSIST PRN, USE OF RW, AND HOME PT  IF FAMILY UNABLE TO PROVIDE ASSISTANCE PRN RECOMMEND D/C TO STR  PATIENT WILL BENEFIT FROM CONTINUED SKILLED INPT PT THIS ADMISSION TO ACHIEVE MAXIMAL FUNCTION AND SAFETY  Barriers to Discharge Decreased caregiver support   Barriers to Discharge Comments PATIENT REPORTS HOME ALONE FOR PARTS OF THE DAY    Goals   Patient Goals NONE EXPRESSED   LTG Expiration Date 12/04/17   Long Term Goal #1 7-10 DAYS: 1) COMPLETE BED MOBILITY MOD-I; 2) PERFORM SIT<-->STAND TRANSFER MOD-I; 3) AMBULATE 200 FEET I/MOD- W/ LEAST RESTRICTIVE DEVICE; 4) IMPROVE B/L LE STRENGTH BY 1/2 GRADE; 5) NAVIGATE 1 STEPS S LEVEL; 6) IMPROVE BALANCE BY 1 GRADE    Treatment Day 0   Plan   Treatment/Interventions Functional transfer training;LE strengthening/ROM; Elevations; Therapeutic exercise;Gait training;Bed mobility; Equipment eval/education;OT   PT Frequency 5x/wk   Recommendation   Recommendation Home with family support;Home PT  (VS STR IF FAMILY UNABLE TO PROVIDE ASSISTANCE)   Equipment Recommended Walker  (PATIENT HAS RW AT HOME)   PT - OK to Discharge Yes  (HOME, FAMILY SUPPORT, RW, HOME PT WHEN MED CHARLY )   Barthel Index   Feeding 5   Bathing 0   Grooming Score 0   Dressing Score 5   Bladder Score 5   Bowels Score 5   Toilet Use Score 5   Transfers (Bed/Chair) Score 10   Mobility (Level Surface) Score 0   Stairs Score 0   Barthel Index Score 35         Brie Padilla, PT

## 2017-11-24 NOTE — PLAN OF CARE
Problem: OCCUPATIONAL THERAPY ADULT  Goal: Performs self-care activities at highest level of function for planned discharge setting  See evaluation for individualized goals  Treatment Interventions: ADL retraining, Functional transfer training, Endurance training          See flowsheet documentation for full assessment, interventions and recommendations  Limitation: Decreased ADL status, Decreased endurance, Decreased high-level ADLs, Decreased self-care trans  Prognosis: Fair  Assessment: Pt is a 80 y o  male seen for OT evaluation s/p admit to [facility] on 11/23/2017 w/ Fever  Pt has presented w/ Diarrhea  Comorbidities affecting pt's functional performance at time of assessment include: obesity , ARF  Personal factors affecting pt at time of IE include:limited home support and difficulty performing ADLS  Prior to admission, Required assist for ADLs, IADLs  Upon evaluation: Pt requires MOD/MAX A for LB ALDS at this time 2* the following deficits impacting occupational performance: decreased ROM, decreased strength, decreased balance and decreased tolerance, frequent falls  Pt to benefit from continued skilled OT tx while in the hospital to address deficits as defined above and maximize level of functional independence w ADL's and functional mobility  Occupational Performance areas to address include: grooming, bathing/shower, toilet hygiene and dressing  From OT standpoint  Recommend short term inpatient rehab VS Home with family support and assistance if able when medically stable  Will continue to follow 3-5 x weekly to address the following below stated goals       OT Discharge Recommendation: 24 hour supervision/assist (Home with family support, VS Inpt rehab)

## 2017-11-24 NOTE — CASE MANAGEMENT
Initial Clinical Review    Admission: Date/Time/Statement: 11/23/17 @ 1930     Orders Placed This Encounter   Procedures    Inpatient Admission     Standing Status:   Standing     Number of Occurrences:   1     Order Specific Question:   Admitting Physician     Answer:   Mercedes Hendricks     Order Specific Question:   Level of Care     Answer:   Level 2 Stepdown / HOT [14]     Order Specific Question:   Estimated length of stay     Answer:   More than 2 Midnights     Order Specific Question:   Certification     Answer:   I certify that inpatient services are medically necessary for this patient for a duration of greater than two midnights  See H&P and MD Progress Notes for additional information about the patient's course of treatment  ED: Date/Time/Mode of Arrival:   ED Arrival Information     Expected Arrival Acuity Means of Arrival Escorted By Service Admission Type    - 11/23/2017 14:47 Emergent Ambulance [facility] Complaint    fall          Chief Complaint:   Chief Complaint   Patient presents with   Lacy [place] Fall     pt has been falling more than usual  family states that pt has not been as steady on his feet as he usually is  pt reports bi lat knee pain   Gait Problem       History of Illness:    Kathie Aiken is a 80 y o  male who presents with evaluation for recurrent falls  The patient reportedly has been having worsening symptoms of gait dysfunction  Patient was noted to have increasing symptoms of diarrhea with multiple bowel movements a day  The patient denies any nausea vomiting currently  He presents to the hospital with a 102 fever  The patient's daughter reports they had starting having diarrhea starting at approximately 2:00 a m  earlier today    The patient presents with a profound sense of weakness and decreased ability to ambulate    ED Vital Signs:   ED Triage Vitals   Temperature Pulse Respirations Blood Pressure SpO2   11/23/17 [phone] 11/23/17 1450 11/23/17 1450 11/23/17 1450 11/23/17 1450   99 8 °F (37 7 °C) 92 18 154/75 95 %      Temp Source Heart Rate Source Patient Position - Orthostatic VS BP Location FiO2 (%)   11/23/17 1453 11/23/17 1450 11/23/17 1450 11/23/17 1450 --   Oral Monitor Sitting Right arm       Pain Score       11/23/17 1730       No Pain        Wt Readings from Last 1 Encounters:   11/23/17 107 kg (236 lb 8 9 oz)       Vital Signs (abnormal):    above    Abnormal Labs/Diagnostic Test Results:   Lactic  Acid   3 8  Creat   1 49  Albumin   3 2  Total  Bili  1 02  CXR:     Limited as above   Cardiomegaly   Mild hazy opacity at the LEFT lung base as above  Ct  Abd/pelvis:    Diffuse fluid content throughout the colon, no focal inflammatory changes or evidence of obstruction demonstrated   Incompletely visualized large bilateral inguinal hernias containing small bowel on the RIGHT and: On the LEFT  Hiatal hernia  Bladder wall thickening, mild asymmetric thickened on the RIGHT of uncertain significance, limited evaluation given mild distention   Can be related to chronic outlet obstructive changes    Ct  Head:    No acute intracranial abnormality    ED Treatment:   Medication Administration from 11/23/2017 1447 to 11/23/2017 2139       Date/Time Order Dose Route Action Action by Comments     11/23/2017 1725 sodium chloride 0 9 % bolus 1,000 mL 0 mL Intravenous Stopped Jatinder Chaudhary RN      11/23/2017 1530 sodium chloride 0 9 % bolus 1,000 mL 1,000 mL Intravenous New Bag Juan Diego Matamoros RN      11/23/2017 1533 acetaminophen (TYLENOL) tablet 975 mg 975 mg Oral Given Juan Diego Matamoros RN      11/23/2017 1953 sodium chloride 0 9 % bolus 1,000 mL 0 mL Intravenous Stopped Huy Callejas RN      11/23/2017 1730 sodium chloride 0 9 % bolus 1,000 mL 1,000 mL Intravenous New Bag Jatinder Chaudhary RN      11/23/2017 1639 iodixanol (VISIPAQUE) 320 MG/ML injection 100 mL 100 mL Intravenous Given Albanian Horseman           Past Medical/Surgical History: Active Ambulatory Problems     Diagnosis Date Noted    No Active Ambulatory Problems     Resolved Ambulatory Problems     Diagnosis Date Noted    No Resolved Ambulatory Problems     Past Medical History:   Diagnosis Date    Atrial fibrillation ([facility] [address] )     Stroke [facility])        Admitting Diagnosis: Dehydration [E86 0]  Gastroenteritis [K52 9]  Knee injury [S89 90XA]    Age/Sex: 80 y o  male    · Assessment/Plan:   Febrile illness secondary to probable gastroenteritis  ? Continue supportive care  ? Fluid status station  ? Stool for C diff  ? Physical therapy/occupational therapy     Plan for Additional Problems:   · Acute renal failure secondary to dehydration  Continue fluid resuscitation  Monitor creatinine  Unclear baseline  Outpatient labs in 2012 showed creatinine 1  · Chronic hernia bilateral   Outpatient follow-up  · Hyperlipidemia-statin  · BPH-Flomax  · Gout-allopurinol     VTE Prophylaxis: Heparin  / sequential compression device   Code Status:  Full code for now  POLST: There is no POLST form on file for this patient (pre-hospital)     Anticipated Length of Stay:  Patient will be admitted on an Inpatient basis with an anticipated length of stay of  greater than 2 midnights  Justification for Hospital Stay:  Needs further evaluation of renal function and hydration status      Admission Orders:       11/23   @  1931  Scheduled Meds:   allopurinol 100 mg Oral Daily   aspirin 81 mg Oral Daily   doxazosin 1 mg Oral HS   famotidine 20 mg Oral Daily   heparin (porcine) 5,000 Units Subcutaneous Q8H [address]   pravastatin 40 mg Oral Daily With Dinner   tamsulosin 0 4 mg Oral Daily With Dinner     Continuous Infusions:    PRN Meds: ondansetron     PT/OT  Reg diet  Stool c/diff

## 2017-11-25 VITALS
TEMPERATURE: 98 F | BODY MASS INDEX: 37.13 KG/M2 | OXYGEN SATURATION: 94 % | WEIGHT: 236.55 LBS | RESPIRATION RATE: 19 BRPM | HEIGHT: 67 IN | SYSTOLIC BLOOD PRESSURE: 117 MMHG | HEART RATE: 95 BPM | DIASTOLIC BLOOD PRESSURE: 72 MMHG

## 2017-11-25 PROBLEM — N17.9 ARF (ACUTE RENAL FAILURE) (HCC): Status: RESOLVED | Noted: 2017-11-23 | Resolved: 2017-11-25

## 2017-11-25 PROBLEM — K52.9 GASTROENTERITIS: Status: RESOLVED | Noted: 2017-11-23 | Resolved: 2017-11-25

## 2017-11-25 LAB
ANION GAP SERPL CALCULATED.3IONS-SCNC: 7 MMOL/L (ref 4–13)
BUN SERPL-MCNC: 17 MG/DL (ref 5–25)
CALCIUM SERPL-MCNC: 8 MG/DL (ref 8.3–10.1)
CHLORIDE SERPL-SCNC: 112 MMOL/L (ref 100–108)
CO2 SERPL-SCNC: 22 MMOL/L (ref 21–32)
CREAT SERPL-MCNC: 1.07 MG/DL (ref 0.6–1.3)
GFR SERPL CREATININE-BSD FRML MDRD: 60 ML/MIN/1.73SQ M
GLUCOSE SERPL-MCNC: 125 MG/DL (ref 65–140)
POTASSIUM SERPL-SCNC: 3 MMOL/L (ref 3.5–5.3)
SODIUM SERPL-SCNC: 141 MMOL/L (ref 136–145)

## 2017-11-25 PROCEDURE — 80048 BASIC METABOLIC PNL TOTAL CA: CPT | Performed by: HOSPITALIST

## 2017-11-25 RX ORDER — LOPERAMIDE HYDROCHLORIDE 2 MG/1
2 CAPSULE ORAL EVERY 4 HOURS PRN
Qty: 30 CAPSULE | Refills: 0 | Status: ON HOLD | OUTPATIENT
Start: 2017-11-25 | End: 2019-01-02 | Stop reason: ALTCHOICE

## 2017-11-25 RX ORDER — LOPERAMIDE HYDROCHLORIDE 2 MG/1
2 CAPSULE ORAL EVERY 4 HOURS PRN
Status: DISCONTINUED | OUTPATIENT
Start: 2017-11-25 | End: 2017-11-25 | Stop reason: HOSPADM

## 2017-11-25 RX ORDER — POTASSIUM CHLORIDE 20 MEQ/1
40 TABLET, EXTENDED RELEASE ORAL 2 TIMES DAILY
Status: COMPLETED | OUTPATIENT
Start: 2017-11-25 | End: 2017-11-25

## 2017-11-25 RX ORDER — ACETAMINOPHEN 325 MG/1
650 TABLET ORAL EVERY 6 HOURS PRN
Status: DISCONTINUED | OUTPATIENT
Start: 2017-11-25 | End: 2017-11-25 | Stop reason: HOSPADM

## 2017-11-25 RX ADMIN — POTASSIUM CHLORIDE 40 MEQ: 1500 TABLET, EXTENDED RELEASE ORAL at 10:10

## 2017-11-25 RX ADMIN — HEPARIN SODIUM 5000 UNITS: 5000 INJECTION, SOLUTION INTRAVENOUS; SUBCUTANEOUS at 14:18

## 2017-11-25 RX ADMIN — POTASSIUM CHLORIDE 40 MEQ: 1500 TABLET, EXTENDED RELEASE ORAL at 16:49

## 2017-11-25 RX ADMIN — ACETAMINOPHEN 650 MG: 325 TABLET, FILM COATED ORAL at 03:38

## 2017-11-25 RX ADMIN — ALLOPURINOL 100 MG: 100 TABLET ORAL at 08:41

## 2017-11-25 RX ADMIN — HEPARIN SODIUM 5000 UNITS: 5000 INJECTION, SOLUTION INTRAVENOUS; SUBCUTANEOUS at 05:17

## 2017-11-25 RX ADMIN — ASPIRIN 81 MG: 81 TABLET, COATED ORAL at 08:41

## 2017-11-25 RX ADMIN — FAMOTIDINE 20 MG: 20 TABLET, FILM COATED ORAL at 08:41

## 2017-11-25 NOTE — SOCIAL WORK
Received a call from [facility] to set up [facility] with nursing and Home PT for this patient  Spoke with patient's niece John Roblero and she state that patient stays with her in [place] through the holiday's and they have used [facility]  Referral made via [employer] and received a returned response that they will see patient as requested by Monday 11/27   Case Management to follow

## 2017-11-25 NOTE — DISCHARGE INSTRUCTIONS
Please note that Lasix was held during your hospital stay due to dehydration and acute renal failure  Please follow-up with your family physician to discuss when to resume this medication once diarrhea completely resolved

## 2017-11-25 NOTE — PHYSICIAN ADVISOR
Current patient class: Inpatient  The patient is currently on Hospital Day: 3      The patient was admitted to the hospital at [address] [place] [address] on 11/23/17 for the following diagnosis:  Dehydration [E86 0]  Gastroenteritis [K52 9]  Knee injury [S89 90XA]       There is documentation in the medical record of an expected length of stay of at least 2 midnights  The patient is therefore expected to satisfy the 2 midnight benchmark and given the 2 midnight presumption is appropriate for INPATIENT ADMISSION  Given this expectation of a satisfying stay, CMS instructs us that the patient is most often appropriate for inpatient admission under part A provided medical necessity is documented in the chart  After review of the relevant documentation, labs, vital signs and test results, the patient is appropriate for INPATIENT ADMISSION  Admission to the hospital as an inpatient is a complex decision making process which requires the practitioner to consider the patients presenting complaint, history and physical examination and all relevant testing  With this in mind, in this case, the patient was deemed appropriate for INPATIENT ADMISSION  After review of the documentation and testing available at the time of the admission I concur with this clinical determination of medical necessity  Rationale is as follows:     The patient is a 80 yrs old Male who presented to the ED at 11/23/2017  2:48 PM with a chief complaint of Fall (pt has been falling more than usual  family states that pt has not been as steady on his feet as he usually is  pt reports bi lat knee pain ) and Gait Problem    The patients vitals on arrival were ED Triage Vitals   Temperature Pulse Respirations Blood Pressure SpO2   11/23/17 1453 11/23/17 1450 11/23/17 1450 11/23/17 1450 11/23/17 1450   99 8 °F (37 7 °C) 92 18 154/75 95 %      Temp Source Heart Rate Source Patient Position - Orthostatic VS BP Location FiO2 (%)   11/23/17 1453 11/23/17 1450 11/23/17 1450 11/23/17 1450 --   Oral Monitor Sitting Right arm       Pain Score       11/23/17 1730       No Pain           Past Medical History:   Diagnosis Date    Atrial fibrillation ([facility] [address] )     Stroke [facility])      Past Surgical History:   Procedure Laterality Date    BACK SURGERY      CARDIAC SURGERY      pacemaker placement            Consults have been placed to:   None    Vitals:    11/24/17 1513 11/24/17 2107 11/24/17 2300 11/25/17 0713   BP: 132/72 135/61 141/63 117/72   Pulse: 76  76 95   Resp: 20  20 19   Temp: 98 2 °F (36 8 °C)  98 7 °F (37 1 °C) 98 °F (36 7 °C)   TempSrc: Oral  Oral Oral   SpO2: 97%  95% 94%   Weight:       Height:           Most recent labs:    Recent Labs      11/23/17   1527  11/24/17   0530  11/25/17   0452   WBC  7 50  5 02   --    HGB  13 2  12 1   --    HCT  38 5  35 7*   --    PLT  253  216   --    K  3 5  2 8*  3 0*   NA  139  143  141   CALCIUM  8 5  7 7*  8 0*   BUN  20  18  17   CREATININE  1 49*  1 09  1 07   LIPASE  131   --    --    INR  1 08   --    --    AST  18  22   --    ALT  15  12   --    ALKPHOS  65  52   --    BILITOT  1 02*  0 68   --        Scheduled Meds:  allopurinol 100 mg Oral Daily   aspirin 81 mg Oral Daily   doxazosin 1 mg Oral HS   famotidine 20 mg Oral Daily   heparin (porcine) 5,000 Units Subcutaneous Q8H [address]   pravastatin 40 mg Oral Daily With Dinner   tamsulosin 0 4 mg Oral Daily With Dinner     Continuous Infusions:   PRN Meds:   acetaminophen    ondansetron

## 2017-11-25 NOTE — DISCHARGE SUMMARY
Discharge Summary - [facility] Internal Medicine    Patient Information: Jeannine Moore 80 y o  male MRN: [id number]  Unit/Bed#: [facility] 319-33 Encounter: [id number]    Discharging Physician / Practitioner: Lopez Casas MD  PCP: Rubi Montgomery MD  Admission Date: 11/23/2017  Discharge Date: 11/25/17    Reason for Admission:  Fever, acute renal failure, gastroenteritis    Discharge Diagnoses:   · Febrile illness secondary to gastroenteritis  · Acute kidney injury secondary to dehydration in the setting of acute gastroenteritis  · Hypokalemia  · BPH  · Gout  · Hyperlipidemia  · Chronic bilateral inguinal hernia  · Ambulatory dysfunction/recurrent falls    Consultations During Hospital Stay:  · None    Procedures Performed:   · None    Significant findings:  · CT A/P - Diffuse fluid content throughout the colon, no focal inflammatory changes or evidence of obstruction demonstrated  Incompletely visualized large bilateral inguinal hernias containing small bowel on the RIGHT and: On the LEFT  Hiatal hernia  Bladder wall thickening, mild asymmetric thickened on the RIGHT of uncertain significance, limited evaluation given mild distention  Can be related to chronic outlet obstructive changes    Hospital Course:   Jeannine Moore is a 80 y o  male patient who originally presented to the hospital on 11/23/2017 due to recurrent falls  Patient reportedly had been having worsening gait dysfunction as well as multiple episodes of diarrhea  On presentation, he was noted with a fever of 102  He was also noted with acute kidney injury on presentation with creatinine elevated at 1 49  He was started on IV hydration with eventual resolution of CONCEPCIÓN  He had stool studies done which was negative for C diff  GI symptoms were felt likely viral gastroenteritis  Patient improved, was able to tolerate p o  without nausea vomiting and had decreased episodes of diarrhea    He was evaluated by Physical therapy and recommended for discharge to home with family support and the use of a rolling walker and home PT  Home PT will be set up at discharge  Lasix was held during his hospital stay due to acute kidney injury  He will follow up with his primary care physician to discuss when to resume this medication  Condition at Discharge: stable     Discharge Day Visit / Exam:     Subjective:  No new complaints  Had 1 episode of loose bowel movement today  Denies abdominal pain, no fever or chills, no nausea or vomiting  He is tolerating p o  Jennifer Cruz Vitals: Blood Pressure: 117/72 (11/25/17 0713)  Pulse: 95 (11/25/17 0713)  Temperature: 98 °F (36 7 °C) (11/25/17 0713)  Temp Source: Oral (11/25/17 0713)  Respirations: 19 (11/25/17 0713)  Height: 5' 7" (170 2 cm) (11/23/17 1450)  Weight - Scale: 107 kg (236 lb 8 9 oz) (11/23/17 2153)  SpO2: 94 % (11/25/17 0713)    General Appearance:    Alert, cooperative, no distress, appropriately responsive    Head:    Normocephalic, without obvious abnormality, atraumatic, mucous membranes moist    Eyes:    Conjunctiva/corneas clear, EOM's intact   Neck:   Supple   Lungs:     Clear to auscultation bilaterally, respirations unlabored, no crackles or wheeze     Heart:    Regular rate and rhythm, S1 and S2    Abdomen:     Soft, non-tender, bowel sounds active all four quadrants,     no masses, no organomegaly   Extremities:   Extremities normal, atraumatic, no cyanosis or edema   Neurologic:  nonfocal      Discharge instructions/Information to patient and family:   See after visit summary for information provided to patient and family  Provisions for Follow-Up Care:  See after visit summary for information related to follow-up care and any pertinent home health orders  Disposition: Home      Discharge Statement:  I spent >30 minutes discharging the patient  This time was spent on the day of discharge  I had direct contact with the patient on the day of discharge   Greater than 50% of the total time was spent examining patient, answering all patient questions, arranging and discussing plan of care with patient as well as directly providing post-discharge instructions  Additional time then spent on discharge activities  Discharge Medications:  See after visit summary for reconciled discharge medications provided to patient and family  ** Please Note: Dragon 360 Dictation voice to text software may have been used in the creation of this document   **

## 2017-11-28 LAB
BACTERIA BLD CULT: NORMAL
BACTERIA BLD CULT: NORMAL

## 2018-10-08 ENCOUNTER — TELEPHONE (OUTPATIENT)
Dept: UROLOGY | Facility: MEDICAL CENTER | Age: 83
End: 2018-10-08

## 2018-10-08 NOTE — TELEPHONE ENCOUNTER
Reason for appointment/Complaint/Diagnosis : Frequent Bladder Infections    Insurance: MCR    History of Cancer?  No                  If yes, what kind? N/A    Previous urologist? No                  Records requested/where? No    Outside testing/where? No    Location Preference for office visit?  [place]

## 2018-10-12 ENCOUNTER — OFFICE VISIT (OUTPATIENT)
Dept: UROLOGY | Facility: CLINIC | Age: 83
End: 2018-10-12
Payer: MEDICARE

## 2018-10-12 VITALS — DIASTOLIC BLOOD PRESSURE: 76 MMHG | HEART RATE: 85 BPM | SYSTOLIC BLOOD PRESSURE: 132 MMHG

## 2018-10-12 DIAGNOSIS — N39.0 FREQUENT UTI: Primary | ICD-10-CM

## 2018-10-12 DIAGNOSIS — R31.0 GROSS HEMATURIA: ICD-10-CM

## 2018-10-12 LAB
SL AMB  POCT GLUCOSE, UA: ABNORMAL
SL AMB LEUKOCYTE ESTERASE,UA: ABNORMAL
SL AMB POCT BILIRUBIN,UA: ABNORMAL
SL AMB POCT BLOOD,UA: ABNORMAL
SL AMB POCT CLARITY,UA: CLEAR
SL AMB POCT COLOR,UA: YELLOW
SL AMB POCT KETONES,UA: ABNORMAL
SL AMB POCT NITRITE,UA: ABNORMAL
SL AMB POCT PH,UA: 5
SL AMB POCT SPECIFIC GRAVITY,UA: 1.01
SL AMB POCT URINE PROTEIN: ABNORMAL
SL AMB POCT UROBILINOGEN: ABNORMAL

## 2018-10-12 PROCEDURE — 99203 OFFICE O/P NEW LOW 30 MIN: CPT | Performed by: UROLOGY

## 2018-10-12 PROCEDURE — 81002 URINALYSIS NONAUTO W/O SCOPE: CPT | Performed by: UROLOGY

## 2018-10-12 RX ORDER — FUROSEMIDE 20 MG/1
20 TABLET ORAL EVERY OTHER DAY
COMMUNITY
Start: 2018-09-23

## 2018-10-12 NOTE — LETTER
October 13, 2018     Salty Thompson MD  [address] [address]    Patient: Carlos Boss   YOB: 1926   Date of Visit: 10/12/2018       Dear Dr Phillip Gastelum:    Thank you for referring Lidia Plata to me for evaluation  Below are my notes for this consultation  If you have questions, please do not hesitate to call me  I look forward to following your patient along with you           Sincerely,        Aguilar Beyer MD        CC: No Recipients

## 2018-10-13 NOTE — PROGRESS NOTES
Progress Note - Urology  Eloisa Fitch 80 y o  male MRN: [id number]  Encounter: [id number]      Chief Complaint:   Chief Complaint   Patient presents with    Urinary Tract Infection     New Patient    Gross Hematuria       HPI:    26-year-old male who gives no past history of urologic disease presents now with episodes of recurrent UTI and gross hematuria  Apparently has been treated on multiple occasions in the past few months for UTI  Definitive cultures are not available  He is not on anticoagulation therapy  He has had symptoms suggestive of UTI  He has had no fever chills  No flank pain  No past history of stone disease  The hematuria has been gross  He denied clots  No imaging studies have been performed  He is unaware if there is weight loss  He is asymptomatic at this time  MEDS:    Current Outpatient Prescriptions:     allopurinol (ZYLOPRIM) 100 mg tablet, Take by mouth daily  , Disp: , Rfl:     aspirin (ECOTRIN LOW STRENGTH) 81 mg EC tablet, Take 81 mg by mouth daily, Disp: , Rfl:     doxazosin (CARDURA) 1 mg tablet, Take 2 mg by mouth daily  , Disp: , Rfl:     famotidine (PEPCID) 10 mg tablet, Take by mouth 2 (two) times a day  , Disp: , Rfl:     furosemide (LASIX) 20 mg tablet, , Disp: , Rfl:     lovastatin (MEVACOR) 10 MG tablet, Take by mouth daily at bedtime  , Disp: , Rfl:     tamsulosin (FLOMAX) 0 4 mg, Take by mouth daily with dinner  , Disp: , Rfl:     loperamide (IMODIUM) 2 mg capsule, Take 1 capsule by mouth every 4 (four) hours as needed for diarrhea, Disp: 30 capsule, Rfl: 0      PMH:  Past Medical History:   Diagnosis Date    Atrial fibrillation ([facility] [address] )     Stroke ([facility] [address] )          [address]  Past Surgical History:   Procedure Laterality Date    BACK SURGERY      CARDIAC SURGERY      pacemaker placement          ROS:  Review of Systems      Vitals:  Blood pressure 132/76, pulse 85        Physical Exam:     General Appearance    Obese elderly male in no acute distress  Cardiac   Heart rate is irregular I do not appreciate a murmur  Pulmonary    Chest is clear  Abdomen   Markedly protuberant  It is nontender  I cannot appreciate any mass  I cannot appreciate any spleen or liver enlargement  Back    No CVA tenderness  Genitalia   Penis unremarkable for age  Testes unremarkable for age  Rectal examination deferred to time of cysto  Extremities    Lower extremity edema +1  Neurologic   Patient has difficulty with gait  Lab, Imaging and other studies:  Recent Results (from the past 672 hour(s))   POCT urine dip    Collection Time: 10/12/18  2:36 PM   Result Value Ref Range    LEUKOCYTE ESTERASE,UA trace     NITRITE,UA neg     SL AMB POCT UROBILINOGEN neg     SL AMB POCT URINE PROTEIN neg      PH,UA 5      BLOOD,UA ++     SPECIFIC GRAVITY,UA 1 010     KETONES,UA neg      BILIRUBIN,UA neg     GLUCOSE, UA neg      COLOR,UA yellow      CLARITY,UA clear            IMPRESSION:    Gross hematuria    PLAN:   patient will undergo renal imaging study with ultrasound  I will assess his BMP    He will undergo cystoscopy

## 2018-10-18 ENCOUNTER — HOSPITAL ENCOUNTER (OUTPATIENT)
Dept: RADIOLOGY | Age: 83
Discharge: HOME | End: 2018-10-18
Payer: MEDICARE

## 2018-10-18 DIAGNOSIS — N39.0 FREQUENT UTI: ICD-10-CM

## 2018-10-18 PROCEDURE — 51798 US URINE CAPACITY MEASURE: CPT

## 2018-11-19 ENCOUNTER — OFFICE VISIT (OUTPATIENT)
Dept: UROLOGY | Facility: CLINIC | Age: 83
End: 2018-11-19
Payer: MEDICARE

## 2018-11-19 VITALS
WEIGHT: 235 LBS | BODY MASS INDEX: 36.88 KG/M2 | HEART RATE: 101 BPM | HEIGHT: 67 IN | DIASTOLIC BLOOD PRESSURE: 62 MMHG | SYSTOLIC BLOOD PRESSURE: 128 MMHG

## 2018-11-19 DIAGNOSIS — R31.0 GROSS HEMATURIA: Primary | ICD-10-CM

## 2018-11-19 LAB
SL AMB  POCT GLUCOSE, UA: ABNORMAL
SL AMB LEUKOCYTE ESTERASE,UA: ABNORMAL
SL AMB POCT BILIRUBIN,UA: ABNORMAL
SL AMB POCT BLOOD,UA: ABNORMAL
SL AMB POCT CLARITY,UA: CLEAR
SL AMB POCT COLOR,UA: YELLOW
SL AMB POCT KETONES,UA: ABNORMAL
SL AMB POCT NITRITE,UA: ABNORMAL
SL AMB POCT PH,UA: 7
SL AMB POCT SPECIFIC GRAVITY,UA: 1.01
SL AMB POCT URINE PROTEIN: ABNORMAL
SL AMB POCT UROBILINOGEN: ABNORMAL

## 2018-11-19 PROCEDURE — 81002 URINALYSIS NONAUTO W/O SCOPE: CPT | Performed by: PHYSICIAN ASSISTANT

## 2018-11-19 PROCEDURE — 99213 OFFICE O/P EST LOW 20 MIN: CPT | Performed by: PHYSICIAN ASSISTANT

## 2018-11-19 RX ORDER — CIPROFLOXACIN 500 MG/1
500 TABLET, FILM COATED ORAL EVERY 12 HOURS SCHEDULED
Qty: 14 TABLET | Refills: 0 | Status: SHIPPED | OUTPATIENT
Start: 2018-11-19 | End: 2018-11-26

## 2018-11-19 RX ORDER — DOCUSATE SODIUM 100 MG/1
100 CAPSULE, LIQUID FILLED ORAL AS NEEDED
COMMUNITY

## 2018-11-19 NOTE — PROGRESS NOTES
UROLOGY PROGRESS NOTE   Patient Identifiers: Catarino Morgan (MRN [id number])  Date of Service: 11/19/2018    Subjective:     59-year-old man with a history of gross hematuria  He was seen in the office by Dr Leonor Gay few weeks ago  CT scan and ultrasound recently unremarkable except for some bladder wall thickening  He is a nonsmoker  He presents with several days of gross hematuria without clots  He is on aspirin  No other blood thinners  There is no pain or burning  Patient has    See above      Objective:     VITALS:    Vitals:    11/19/18 1147   BP: 128/62   Pulse: 101     AUA SYMPTOM SCORE      Most Recent Value   AUA SYMPTOM SCORE   How often have you had a sensation of not emptying your bladder completely after you finished urinating? 0   How often have you had to urinate again less than two hours after you finished urinating? 5   How often have you found you stopped and started again several times when you urinate?  0   How often have you found it difficult to postpone urination? 0   How often have you had a weak urinary stream?  0   How often have you had to push or strain to begin urination? 0   How many times did you most typically get up to urinate from the time you went to bed at night until the time you got up in the morning?   3   Quality of Life: If you were to spend the rest of your life with your urinary condition just the way it is now, how would you feel about that?  2   AUA SYMPTOM SCORE  8            LABS:  Lab Results   Component Value Date    HGB 12 1 11/24/2017    HCT 35 7 (L) 11/24/2017    WBC 5 02 11/24/2017     11/24/2017   ]    Lab Results   Component Value Date    K 3 0 (L) 11/25/2017     (H) 11/25/2017    CO2 22 11/25/2017    BUN 17 11/25/2017    CREATININE 1 07 11/25/2017    CALCIUM 8 0 (L) 11/25/2017   ]        INPATIENT MEDS:    Current Outpatient Prescriptions:     allopurinol (ZYLOPRIM) 100 mg tablet, Take by mouth daily  , Disp: , Rfl:     aspirin (ECOTRIN LOW STRENGTH) 81 mg EC tablet, Take 81 mg by mouth daily, Disp: , Rfl:     docusate sodium (COLACE) 100 mg capsule, Take 100 mg by mouth 2 (two) times a day, Disp: , Rfl:     doxazosin (CARDURA) 1 mg tablet, Take 2 mg by mouth daily  , Disp: , Rfl:     famotidine (PEPCID) 10 mg tablet, Take by mouth 2 (two) times a day  , Disp: , Rfl:     furosemide (LASIX) 20 mg tablet, , Disp: , Rfl:     lovastatin (MEVACOR) 10 MG tablet, Take by mouth daily at bedtime  , Disp: , Rfl:     tamsulosin (FLOMAX) 0 4 mg, Take by mouth daily with dinner  , Disp: , Rfl:     ciprofloxacin (CIPRO) 500 mg tablet, Take 1 tablet (500 mg total) by mouth every 12 (twelve) hours for 7 days, Disp: 14 tablet, Rfl: 0    loperamide (IMODIUM) 2 mg capsule, Take 1 capsule by mouth every 4 (four) hours as needed for diarrhea (Patient not taking: Reported on 11/19/2018 ), Disp: 30 capsule, Rfl: 0      Physical Exam:   /62 (BP Location: Left arm, Patient Position: Sitting, Cuff Size: Adult)   Pulse 101   Ht 5' 7" (1 702 m)   Wt 107 kg (235 lb)   BMI 36 81 kg/m²   GEN: no acute distress    RESP: breathing comfortably with no accessory muscle use    ABD: soft, non-tender, non-distended   INCISION:    EXT: no significant peripheral edema       RADIOLOGY:    none     Assessment:    1  Gross hematuria     Plan:   - put him on Cipro 500 mg b i d  For 7 days  - schedule cystoscopy in the office then discuss the results    -  -

## 2018-11-29 ENCOUNTER — PROCEDURE VISIT (OUTPATIENT)
Dept: UROLOGY | Facility: AMBULATORY SURGERY CENTER | Age: 83
End: 2018-11-29
Payer: MEDICARE

## 2018-11-29 VITALS
DIASTOLIC BLOOD PRESSURE: 60 MMHG | HEIGHT: 67 IN | BODY MASS INDEX: 37.51 KG/M2 | WEIGHT: 239 LBS | SYSTOLIC BLOOD PRESSURE: 124 MMHG

## 2018-11-29 DIAGNOSIS — N32.89 BLADDER MASS: ICD-10-CM

## 2018-11-29 DIAGNOSIS — R31.0 GROSS HEMATURIA: Primary | ICD-10-CM

## 2018-11-29 LAB
SL AMB  POCT GLUCOSE, UA: NORMAL
SL AMB LEUKOCYTE ESTERASE,UA: NORMAL
SL AMB POCT BILIRUBIN,UA: NORMAL
SL AMB POCT BLOOD,UA: NORMAL
SL AMB POCT CLARITY,UA: NORMAL
SL AMB POCT COLOR,UA: YELLOW
SL AMB POCT KETONES,UA: NORMAL
SL AMB POCT NITRITE,UA: NORMAL
SL AMB POCT PH,UA: 5
SL AMB POCT SPECIFIC GRAVITY,UA: 1.01
SL AMB POCT URINE PROTEIN: NORMAL
SL AMB POCT UROBILINOGEN: NORMAL

## 2018-11-29 PROCEDURE — 81002 URINALYSIS NONAUTO W/O SCOPE: CPT | Performed by: UROLOGY

## 2018-11-29 PROCEDURE — 88112 CYTOPATH CELL ENHANCE TECH: CPT | Performed by: PATHOLOGY

## 2018-11-29 PROCEDURE — 99213 OFFICE O/P EST LOW 20 MIN: CPT | Performed by: UROLOGY

## 2018-11-29 PROCEDURE — 52000 CYSTOURETHROSCOPY: CPT | Performed by: UROLOGY

## 2018-11-29 RX ORDER — CEFAZOLIN SODIUM 2 G/50ML
2000 SOLUTION INTRAVENOUS ONCE
Status: CANCELLED | OUTPATIENT
Start: 2018-11-29 | End: 2018-11-29

## 2018-11-29 NOTE — PROGRESS NOTES
Cystoscopy  Date/Time: 11/29/2018 9:23 AM  Performed by: Adrián Valero  Authorized by: Adrián Valero         Written Consent Obtained      Indications for Procedure:   gross hematuria     Physical Exam     Constitutional   General appearance: No acute distress, well appearing and well nourished  Pulmonary   Respiratory effort: No increased work of breathing or signs of respiratory distress  Cardiovascular   Examination of extremities for edema and/or varicosities: Normal     Abdomen   Abdomen: Non-tender, no masses  Liver and spleen: No hepatomegaly or splenomegaly  Genitourinary   Very large inguinal hernia that encompasses the entire infrapubic region  I am unable to visualize the penis or meatus at all secondary to this  Musculoskeletal   Gait and station: Normal     Skin   Skin and subcutaneous tissue: Normal without rashes or lesions  Lymphatic   Palpation of lymph nodes in groin: No lymphadenopathy  Additional Exam: Neuro exam nonfocal   The flexible cystoscope was introduced into the urethra and advanced into the bladder  URETHRA:  Normal,  without strictures or lesions  PROSTATE:  Moderate bilobar obstruction  TRIGONE & UOs:   Normal anatomy with efflux of clear urine  No mucosal lesions or subtrigonal masses  BLADDER MUCOSA:  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 bladder wall with associated clot over top  DETRUSOR: Normal capacity, without flaccidity, without excessive compliance, without trabeculation or diverticula, without uninhibited bladder contractions on fillings  RETROFLEXED SCOPE VIEW: Normal bladder neck    Plan:  The patient has a bladder tumor concerning for malignancy  I discussed with his family and him treatment options  I recommended proceeding with transurethral resection of bladder tumor  This will be difficult to do given his large hernia but hopefully should be possible    Risks and benefits were discussed and he was consented for the procedure  We will schedule this in the near future  In addition to the cystoscopy today I spent over 15 minutes discussing the findings and treatment options and consenting him for transurethral resection of bladder tumor

## 2018-12-01 ENCOUNTER — OFFICE VISIT (OUTPATIENT)
Dept: LAB | Age: 83
End: 2018-12-01
Payer: MEDICARE

## 2018-12-01 ENCOUNTER — APPOINTMENT (OUTPATIENT)
Dept: LAB | Age: 83
End: 2018-12-01
Payer: MEDICARE

## 2018-12-01 ENCOUNTER — TRANSCRIBE ORDERS (OUTPATIENT)
Dept: ADMINISTRATIVE | Age: 83
End: 2018-12-01

## 2018-12-01 DIAGNOSIS — N32.89 BLADDER MASS: ICD-10-CM

## 2018-12-01 LAB
ANION GAP SERPL CALCULATED.3IONS-SCNC: 6 MMOL/L (ref 4–13)
APTT PPP: 32 SECONDS (ref 26–38)
BASOPHILS # BLD AUTO: 0.03 THOUSANDS/ΜL (ref 0–0.1)
BASOPHILS NFR BLD AUTO: 0 % (ref 0–1)
BUN SERPL-MCNC: 16 MG/DL (ref 5–25)
CALCIUM SERPL-MCNC: 8.9 MG/DL (ref 8.3–10.1)
CHLORIDE SERPL-SCNC: 102 MMOL/L (ref 100–108)
CO2 SERPL-SCNC: 31 MMOL/L (ref 21–32)
CREAT SERPL-MCNC: 1.19 MG/DL (ref 0.6–1.3)
EOSINOPHIL # BLD AUTO: 0.24 THOUSAND/ΜL (ref 0–0.61)
EOSINOPHIL NFR BLD AUTO: 3 % (ref 0–6)
ERYTHROCYTE [DISTWIDTH] IN BLOOD BY AUTOMATED COUNT: 13.1 % (ref 11.6–15.1)
GFR SERPL CREATININE-BSD FRML MDRD: 53 ML/MIN/1.73SQ M
GLUCOSE SERPL-MCNC: 139 MG/DL (ref 65–140)
HCT VFR BLD AUTO: 39.3 % (ref 36.5–49.3)
HGB BLD-MCNC: 12.4 G/DL (ref 12–17)
IMM GRANULOCYTES # BLD AUTO: 0.02 THOUSAND/UL (ref 0–0.2)
IMM GRANULOCYTES NFR BLD AUTO: 0 % (ref 0–2)
INR PPP: 0.97 (ref 0.86–1.17)
LYMPHOCYTES # BLD AUTO: 2.23 THOUSANDS/ΜL (ref 0.6–4.47)
LYMPHOCYTES NFR BLD AUTO: 30 % (ref 14–44)
MCH RBC QN AUTO: 31.2 PG (ref 26.8–34.3)
MCHC RBC AUTO-ENTMCNC: 31.6 G/DL (ref 31.4–37.4)
MCV RBC AUTO: 99 FL (ref 82–98)
MONOCYTES # BLD AUTO: 0.58 THOUSAND/ΜL (ref 0.17–1.22)
MONOCYTES NFR BLD AUTO: 8 % (ref 4–12)
NEUTROPHILS # BLD AUTO: 4.27 THOUSANDS/ΜL (ref 1.85–7.62)
NEUTS SEG NFR BLD AUTO: 59 % (ref 43–75)
NRBC BLD AUTO-RTO: 0 /100 WBCS
PLATELET # BLD AUTO: 308 THOUSANDS/UL (ref 149–390)
PMV BLD AUTO: 10.2 FL (ref 8.9–12.7)
POTASSIUM SERPL-SCNC: 3.8 MMOL/L (ref 3.5–5.3)
PROTHROMBIN TIME: 13 SECONDS (ref 11.8–14.2)
RBC # BLD AUTO: 3.97 MILLION/UL (ref 3.88–5.62)
SODIUM SERPL-SCNC: 139 MMOL/L (ref 136–145)
WBC # BLD AUTO: 7.37 THOUSAND/UL (ref 4.31–10.16)

## 2018-12-01 PROCEDURE — 85610 PROTHROMBIN TIME: CPT

## 2018-12-01 PROCEDURE — 93005 ELECTROCARDIOGRAM TRACING: CPT

## 2018-12-01 PROCEDURE — 87086 URINE CULTURE/COLONY COUNT: CPT

## 2018-12-01 PROCEDURE — 85730 THROMBOPLASTIN TIME PARTIAL: CPT

## 2018-12-01 PROCEDURE — 80048 BASIC METABOLIC PNL TOTAL CA: CPT

## 2018-12-01 PROCEDURE — 87186 SC STD MICRODIL/AGAR DIL: CPT

## 2018-12-01 PROCEDURE — 87077 CULTURE AEROBIC IDENTIFY: CPT

## 2018-12-01 PROCEDURE — 36415 COLL VENOUS BLD VENIPUNCTURE: CPT

## 2018-12-01 PROCEDURE — 85025 COMPLETE CBC W/AUTO DIFF WBC: CPT

## 2018-12-03 LAB
ATRIAL RATE: 300 BPM
BACTERIA UR CULT: ABNORMAL
BACTERIA UR CULT: ABNORMAL
QRS AXIS: -14 DEGREES
QRSD INTERVAL: 82 MS
QT INTERVAL: 216 MS
QTC INTERVAL: 262 MS
T WAVE AXIS: 211 DEGREES
VENTRICULAR RATE: 89 BPM

## 2018-12-03 PROCEDURE — 93010 ELECTROCARDIOGRAM REPORT: CPT | Performed by: INTERNAL MEDICINE

## 2018-12-06 ENCOUNTER — TELEPHONE (OUTPATIENT)
Dept: UROLOGY | Facility: AMBULATORY SURGERY CENTER | Age: 83
End: 2018-12-06

## 2018-12-06 DIAGNOSIS — N39.0 ACUTE UTI: Primary | ICD-10-CM

## 2018-12-06 RX ORDER — AMOXICILLIN AND CLAVULANATE POTASSIUM 875; 125 MG/1; MG/1
1 TABLET, FILM COATED ORAL EVERY 12 HOURS SCHEDULED
Qty: 14 TABLET | Refills: 0 | Status: SHIPPED | OUTPATIENT
Start: 2018-12-06 | End: 2018-12-13

## 2018-12-06 NOTE — TELEPHONE ENCOUNTER
The patient's urine culture is positive  I have sent antibiotics to his pharmacy  Please have him take the antibiotics and then repeat a urine culture which I also ordered in preparation for surgery

## 2018-12-06 NOTE — TELEPHONE ENCOUNTER
Spoke with patient's niece and informed her of UTI  Antibiotic was sent to pharmacy by Dr Buzz Whiteside  Wait about 5 days after completion of antibiotic and repeat urine testing  The order is already in patient's chart, and niece states he will go to [facility] approx 12/18/18  His surgery is scheduled for 1/2/19 with Dr Buzz Whiteside  All questions were answered to nianiya's satisfaction

## 2018-12-11 ENCOUNTER — TRANSCRIBE ORDERS (OUTPATIENT)
Dept: ADMINISTRATIVE | Facility: HOSPITAL | Age: 83
End: 2018-12-11

## 2018-12-11 DIAGNOSIS — E11.649 UNCONTROLLED TYPE 2 DIABETES MELLITUS WITH HYPOGLYCEMIA, UNSPECIFIED HYPOGLYCEMIA COMA STATUS (HCC): ICD-10-CM

## 2018-12-11 DIAGNOSIS — I10 HYPERTENSION, ESSENTIAL: ICD-10-CM

## 2018-12-11 DIAGNOSIS — I50.32 CHRONIC DIASTOLIC HEART FAILURE (HCC): Primary | ICD-10-CM

## 2018-12-20 RX ORDER — LISINOPRIL 2.5 MG/1
2.5 TABLET ORAL
COMMUNITY

## 2018-12-20 NOTE — PRE-PROCEDURE INSTRUCTIONS
Pre-Surgery Instructions:   Medication Instructions    allopurinol (ZYLOPRIM) 100 mg tablet Instructed patient per Anesthesia Guidelines   aspirin (ECOTRIN LOW STRENGTH) 81 mg EC tablet Patient was instructed by Physician and understands   docusate sodium (COLACE) 100 mg capsule Instructed patient per Anesthesia Guidelines   doxazosin (CARDURA) 1 mg tablet Instructed patient per Anesthesia Guidelines   famotidine (PEPCID) 10 mg tablet Instructed patient per Anesthesia Guidelines   furosemide (LASIX) 20 mg tablet Instructed patient per Anesthesia Guidelines   lisinopril (ZESTRIL) 2 5 mg tablet Instructed patient per Anesthesia Guidelines   lovastatin (MEVACOR) 20 mg tablet Instructed patient per Anesthesia Guidelines   metFORMIN (GLUCOPHAGE) 500 mg tablet Instructed patient per Anesthesia Guidelines   tamsulosin (FLOMAX) 0 4 mg Instructed patient per Anesthesia Guidelines      Pre op and showering instructions using an antibacterial soap reviewed with [employer]

## 2019-01-02 ENCOUNTER — HOSPITAL ENCOUNTER (OUTPATIENT)
Facility: HOSPITAL | Age: 84
Setting detail: OUTPATIENT SURGERY
Discharge: HOME | End: 2019-01-02
Attending: UROLOGY | Admitting: UROLOGY
Payer: MEDICARE

## 2019-01-02 ENCOUNTER — ANESTHESIA EVENT (OUTPATIENT)
Dept: PERIOP | Facility: HOSPITAL | Age: 84
End: 2019-01-02
Payer: MEDICARE

## 2019-01-02 ENCOUNTER — ANESTHESIA (OUTPATIENT)
Dept: PERIOP | Facility: HOSPITAL | Age: 84
End: 2019-01-02
Payer: MEDICARE

## 2019-01-02 VITALS
SYSTOLIC BLOOD PRESSURE: 157 MMHG | RESPIRATION RATE: 18 BRPM | BODY MASS INDEX: 36.88 KG/M2 | TEMPERATURE: 97.3 F | OXYGEN SATURATION: 97 % | HEART RATE: 77 BPM | DIASTOLIC BLOOD PRESSURE: 86 MMHG | HEIGHT: 67 IN | WEIGHT: 235 LBS

## 2019-01-02 DIAGNOSIS — N32.89 BLADDER MASS: ICD-10-CM

## 2019-01-02 LAB
GLUCOSE SERPL-MCNC: 120 MG/DL (ref 65–140)
GLUCOSE SERPL-MCNC: 129 MG/DL (ref 65–140)

## 2019-01-02 PROCEDURE — 52235 CYSTOSCOPY AND TREATMENT: CPT | Performed by: UROLOGY

## 2019-01-02 PROCEDURE — 88307 TISSUE EXAM BY PATHOLOGIST: CPT | Performed by: PATHOLOGY

## 2019-01-02 PROCEDURE — 82948 REAGENT STRIP/BLOOD GLUCOSE: CPT

## 2019-01-02 PROCEDURE — C1769 GUIDE WIRE: HCPCS | Performed by: UROLOGY

## 2019-01-02 RX ORDER — FENTANYL CITRATE 50 UG/ML
INJECTION, SOLUTION INTRAMUSCULAR; INTRAVENOUS AS NEEDED
Status: DISCONTINUED | OUTPATIENT
Start: 2019-01-02 | End: 2019-01-02 | Stop reason: SURG

## 2019-01-02 RX ORDER — TRAMADOL HYDROCHLORIDE 50 MG/1
50 TABLET ORAL EVERY 6 HOURS PRN
Status: DISCONTINUED | OUTPATIENT
Start: 2019-01-02 | End: 2019-01-02 | Stop reason: HOSPADM

## 2019-01-02 RX ORDER — FENTANYL CITRATE/PF 50 MCG/ML
12.5 SYRINGE (ML) INJECTION
Status: DISCONTINUED | OUTPATIENT
Start: 2019-01-02 | End: 2019-01-02 | Stop reason: HOSPADM

## 2019-01-02 RX ORDER — MAGNESIUM HYDROXIDE 1200 MG/15ML
LIQUID ORAL AS NEEDED
Status: DISCONTINUED | OUTPATIENT
Start: 2019-01-02 | End: 2019-01-02 | Stop reason: HOSPADM

## 2019-01-02 RX ORDER — ACETAMINOPHEN 500 MG
500 TABLET ORAL EVERY 6 HOURS PRN
COMMUNITY

## 2019-01-02 RX ORDER — ONDANSETRON 2 MG/ML
INJECTION INTRAMUSCULAR; INTRAVENOUS AS NEEDED
Status: DISCONTINUED | OUTPATIENT
Start: 2019-01-02 | End: 2019-01-02 | Stop reason: SURG

## 2019-01-02 RX ORDER — LIDOCAINE HYDROCHLORIDE 10 MG/ML
INJECTION, SOLUTION INFILTRATION; PERINEURAL AS NEEDED
Status: DISCONTINUED | OUTPATIENT
Start: 2019-01-02 | End: 2019-01-02 | Stop reason: SURG

## 2019-01-02 RX ORDER — CIPROFLOXACIN 500 MG/1
500 TABLET, FILM COATED ORAL 2 TIMES DAILY
Qty: 6 TABLET | Refills: 0 | Status: SHIPPED | OUTPATIENT
Start: 2019-01-02 | End: 2019-01-05

## 2019-01-02 RX ORDER — CEFAZOLIN SODIUM 2 G/50ML
2000 SOLUTION INTRAVENOUS ONCE
Status: COMPLETED | OUTPATIENT
Start: 2019-01-02 | End: 2019-01-02

## 2019-01-02 RX ORDER — SODIUM CHLORIDE 9 MG/ML
INJECTION, SOLUTION INTRAVENOUS CONTINUOUS PRN
Status: DISCONTINUED | OUTPATIENT
Start: 2019-01-02 | End: 2019-01-02 | Stop reason: SURG

## 2019-01-02 RX ORDER — PROPOFOL 10 MG/ML
INJECTION, EMULSION INTRAVENOUS AS NEEDED
Status: DISCONTINUED | OUTPATIENT
Start: 2019-01-02 | End: 2019-01-02 | Stop reason: SURG

## 2019-01-02 RX ORDER — SODIUM CHLORIDE 9 MG/ML
20 INJECTION, SOLUTION INTRAVENOUS CONTINUOUS
Status: DISCONTINUED | OUTPATIENT
Start: 2019-01-02 | End: 2019-01-02 | Stop reason: HOSPADM

## 2019-01-02 RX ORDER — ONDANSETRON 2 MG/ML
4 INJECTION INTRAMUSCULAR; INTRAVENOUS ONCE AS NEEDED
Status: DISCONTINUED | OUTPATIENT
Start: 2019-01-02 | End: 2019-01-02 | Stop reason: HOSPADM

## 2019-01-02 RX ADMIN — LIDOCAINE HYDROCHLORIDE ANHYDROUS 50 MG: 10 INJECTION, SOLUTION INFILTRATION at 13:21

## 2019-01-02 RX ADMIN — FENTANYL CITRATE 25 MCG: 50 INJECTION INTRAMUSCULAR; INTRAVENOUS at 13:28

## 2019-01-02 RX ADMIN — DEXAMETHASONE SODIUM PHOSPHATE 4 MG: 10 INJECTION INTRAMUSCULAR; INTRAVENOUS at 13:28

## 2019-01-02 RX ADMIN — FENTANYL CITRATE 25 MCG: 50 INJECTION INTRAMUSCULAR; INTRAVENOUS at 13:43

## 2019-01-02 RX ADMIN — CEFAZOLIN SODIUM 2000 MG: 2 SOLUTION INTRAVENOUS at 12:59

## 2019-01-02 RX ADMIN — FENTANYL CITRATE 25 MCG: 50 INJECTION INTRAMUSCULAR; INTRAVENOUS at 14:17

## 2019-01-02 RX ADMIN — SODIUM CHLORIDE: 0.9 INJECTION, SOLUTION INTRAVENOUS at 12:25

## 2019-01-02 RX ADMIN — FENTANYL CITRATE 25 MCG: 50 INJECTION INTRAMUSCULAR; INTRAVENOUS at 14:10

## 2019-01-02 RX ADMIN — MITOMYCIN 40 MG: 40 INJECTION, POWDER, LYOPHILIZED, FOR SOLUTION INTRAVENOUS at 14:25

## 2019-01-02 RX ADMIN — PROPOFOL 90 MG: 10 INJECTION, EMULSION INTRAVENOUS at 13:21

## 2019-01-02 RX ADMIN — ONDANSETRON 4 MG: 2 INJECTION INTRAMUSCULAR; INTRAVENOUS at 14:10

## 2019-01-02 NOTE — ANESTHESIA POSTPROCEDURE EVALUATION
Post-Op Assessment Note      CV Status:  Stable    Mental Status:  Alert    Hydration Status:  Stable    PONV Controlled:  None    Airway Patency:  Patent        Staff: CRNA           BP      Temp     Pulse     Resp      SpO2
Statement Selected

## 2019-01-02 NOTE — ANESTHESIA PREPROCEDURE EVALUATION
Review of Systems/Medical History      No history of anesthetic complications     Cardiovascular  Hyperlipidemia, Hypertension , Dysrhythmias , atrial fibrillation,    Pulmonary  Negative pulmonary ROS        GI/Hepatic  Negative GI/hepatic ROS          Negative  ROS        Endo/Other  Diabetes ,      GYN  Negative gynecology ROS          Hematology  Negative hematology ROS      Musculoskeletal  Negative musculoskeletal ROS        Neurology   Psychology           Physical Exam    Airway    Mallampati score: II  TM Distance: >3 FB  Neck ROM: full     Dental       Cardiovascular      Pulmonary      Other Findings  Missing all but three on top back      Anesthesia Plan  ASA Score- 2     Anesthesia Type- general with ASA Monitors  Additional Monitors:   Airway Plan: LMA  Comment: General anesthesia, LMA; standard ASA monitors  Risks and benefits discussed with patient; patient consented and agrees to proceed  I saw and evaluated the patient  If seen with CRNA, we have discussed the anesthetic plan and I am in agreement that the plan is appropriate for the patient        Plan Factors-    Induction- intravenous  Postoperative Plan- Plan for postoperative opioid use  Planned trial extubation    Informed Consent- Anesthetic plan and risks discussed with patient  I personally reviewed this patient with the CRNA  Discussed and agreed on the Anesthesia Plan with the CRNA  Lambert Orlando

## 2019-01-02 NOTE — INTERIM OP NOTE
TRANSURETHRAL RESECTION OF BLADDER TUMOR (TURBT), INSTILLATION MITOMYCIN  Postoperative Note  PATIENT NAME: Richard Parker  : 1926  MRN: [id number]  AN OR ROOM 02    Surgery Date: 2019    Preop Diagnosis:  Bladder mass [N32 89]    Post-Op Diagnosis Codes: * Bladder mass [N32 89]    Procedure(s) (LRB):  TRANSURETHRAL RESECTION OF BLADDER TUMOR (TURBT) (N/A)  INSTILLATION MITOMYCIN (N/A)    Surgeon(s) and Role:     * Yesenia Rodney MD - Primary     * Bradly Mendoza MD - Assisting    Specimens:  ID Type Source Tests Collected by Time Destination   1 : BLADDER TUMOR Tissue Urinary Bladder TISSUE EXAM Yesenia Rodney MD 2019 [id number]        Estimated Blood Loss:   Minimal    Anesthesia Type:   General     Findings:    3 cm bladder tumor identified and excised    Complications:   None    SIGNATURE: Yesenia Rodney MD   DATE: 2019   TIME: 2:16 PM

## 2019-01-02 NOTE — DISCHARGE INSTRUCTIONS
CYSTOSCOPY, TURBT, PROSTATE BIOPSY, BLADDER BIOPSY   DISCHARGE INSTRUCTIONS    Care after your surgery:  1  You may have burning or red colored urine the first 24 hours  Your burning should  stop in 24 hours and your urine should clear in 24-48 hours  2  You should drink more fluids unless Dr Suzette Dunn advises you not to    3  You should return to normal activities when your urine is clear again  4  You may have a small amount of red drainage from your rectum if you had a prostate  biopsy performed  This should stop in 24 hours  5  Take pain medication as prescribed by your doctor  Call Dr Suzette Dunn if you have any of the followin  You can not urinate or you have active or persistent bleeding, clots, back pain, or  pain when you urinate  2  You have chills, fever above 101 degrees, or feel sick  3  You have persistent nausea or vomiting, persistent dizziness, or lightheadedness  4  Call Dr Suzette Dunn if you have any questions or concerns about your procedure at:  [phone]        Your Green catheter can be removed in 1 week  The office will contact you to schedule this  Green Catheter Placement and Care   WHAT YOU NEED TO KNOW:   A Green catheter is a sterile tube that is inserted into your bladder to drain urine  It is also called an indwelling urinary catheter  The tip of the catheter has a small balloon filled with solution that holds the catheter inside your bladder  DISCHARGE INSTRUCTIONS:   Seek care immediately if:   · Your catheter comes out  · You suddenly have material that looks like sand in the tubing or drainage bag  · No urine is draining into the bag and you have checked the system  · You have pain in your hip, back, pelvis, or lower abdomen  · You are confused or cannot think clearly  Contact your healthcare provider if:   · You have a fever  · You have bladder spasms for more than 1 day after the catheter is placed      · You see blood in the tubing or drainage bag  · You have a rash or itching where the catheter tube is secured to your skin  · Urine leaks from or around the catheter, tubing, or drainage bag  · The closed drainage system has accidently come open or apart  · You see a layer of crystals inside the tubing  · You have questions or concerns about your condition or care  Care for your Green catheter:   · Clean your genital area 2 times every day  Clean your catheter and the area around where it was inserted  Use soap and water  Clean your anal opening and catheter area after every bowel movement  · Secure the catheter tube  so you do not pull or move the catheter  This helps prevent pain and bladder spasms  Healthcare providers will show you how to use medical tape or a strap to secure the catheter tube to your body  · Keep a closed drainage system  Your Green catheter should always be attached to the drainage bag to form a closed system  Do not disconnect any part of the closed system unless you need to change the bag  Care for your drainage bag:   · Ask if a leg bag is right for you  A leg bag can be worn under your clothes  Ask your healthcare provider for more information about a leg bag  · Keep the drainage bag below the level of your waist   This helps stop urine from moving back up the tubing and into your bladder  Do not loop or kink the tubing  This can cause urine to back up and collect in your bladder  Do not let the drainage bag touch or lie on the floor  · Empty the drainage bag when needed  The weight of a full drainage bag can be painful  Empty the drainage bag every 3 to 6 hours or when it is ? full  · Clean and change the drainage bag as directed  Ask your healthcare provider how often you should change the drainage bag and what cleaning solution to use  Wear disposable gloves when you change the bag  Do not allow the end of the catheter or tubing to touch anything   Clean the ends with an alcohol pad before you reconnect them  What to do if problems develop:   · No urine is draining into the bag:      ¨ Check for kinks in the tubing and straighten them out  ¨ Check the tape or strap used to secure the catheter tube to your skin  Make sure it is not blocking the tube  ¨ Make sure you are not sitting or lying on the tubing  ¨ Make sure the urine bag is hanging below the level of your waist     · Urine leaks from or around the catheter, tubing, or drainage bag:  Check if the closed drainage system has accidently come open or apart  Clean the catheter and tubing ends with a new alcohol pad and reconnect them  Prevent an infection:   · Wash your hands often  Wash before and after you touch your catheter, tubing, or drainage bag  Use soap and water  Wear clean disposable gloves when you care for your catheter or disconnect the drainage bag  Wash your hands before you prepare or eat food  · Drink liquids as directed  Ask your healthcare provider how much liquid to drink each day and which liquids are best for you  Liquids will help flush your kidneys and bladder to help prevent infection  Follow up with your healthcare provider as directed:  Write down your questions so you remember to ask them during your visits  © 2017 [employer]  Information is for End User's use only and may not be sold, redistributed or otherwise used for commercial purposes  All illustrations and images included in CareNotes® are the copyrighted property of [employer]  or [employer]  The above information is an  only  It is not intended as medical advice for individual conditions or treatments  Talk to your doctor, nurse or pharmacist before following any medical regimen to see if it is safe and effective for you  Urinary Leg Bag   WHAT YOU NEED TO KNOW:   What is a urinary leg bag? A urinary leg bag holds urine that drains from your catheter   It fits under your clothes and allows you to do your normal daily activities  How do I use a urinary leg bag? · Wash your hands  before and after you touch your catheter, tubing, or drainage bag  Use soap and water  This reduces the risk of infection  · Strap your leg bag to your thigh or calf  Make sure the straps are comfortable  The straps can cause problems with blood flow in your leg if they are too tight  · Clean the tip of the drainage tube with alcohol  before attaching it to your catheter  This helps prevent bacteria from getting into your catheter  · The connecting tube should not pull on your catheter  Skin breakdown can occur if there is constant pulling on the catheter  · Check the tube often to make sure it is not kinked or twisted  Blockage in the tube can cause urine to back up into your bladder  Your urine must flow straight through the tube into your leg bag  · Always keep the leg bag below your bladder  This prevents urine from the bag going back into your bladder, which may cause an infection  · Empty your leg bag when it is ½ full, or every 3 hours  A full bag may break or disconnect from the catheter  · Change to your bedside bag before you go to bed  Your bedside bag can hold more urine  Do not use your leg bag at night because it could become too full or break  · Clean your leg bag after every use  Fill the bag with 2 parts vinegar and 3 parts water  Let it soak for 20 minutes, then rinse and let dry  Follow your healthcare provider's instruction on replacing your leg bag with a new one  CARE AGREEMENT:   You have the right to help plan your care  Learn about your health condition and how it may be treated  Discuss treatment options with your caregivers to decide what care you want to receive  You always have the right to refuse treatment  The above information is an  only   It is not intended as medical advice for individual conditions or treatments  Talk to your doctor, nurse or pharmacist before following any medical regimen to see if it is safe and effective for you  © 2017 [employer] Information is for End User's use only and may not be sold, redistributed or otherwise used for commercial purposes  All illustrations and images included in CareNotes® are the copyrighted property of [employer]  or [employer]

## 2019-01-02 NOTE — OP NOTE
OPERATIVE REPORT  PATIENT NAME: Dannielle Suárez    :  1926  MRN: [id number]  Pt Location: AN OR ROOM 02    SURGERY DATE: 2019    Surgeon(s) and Role:     * Silverio Hwang MD - Primary     * Alka Waddell MD - Assisting    Preop Diagnosis:  Bladder mass [N32 89]    Post-Op Diagnosis Codes: * Bladder mass [N32 89]    Procedure(s) (LRB):  TRANSURETHRAL RESECTION OF BLADDER TUMOR (TURBT) (N/A)    Specimen(s):  ID Type Source Tests Collected by Time Destination   1 : BLADDER TUMOR Tissue Urinary Bladder TISSUE EXAM Silverio Hwang MD 2019 [id number]        Estimated Blood Loss:   Minimal    Drains:  Urethral Catheter Latex; Other (Comment) 20 Fr  (Active)   Site Assessment Clean;Skin intact 2019  3:00 PM   Collection Container Standard drainage bag 2019  3:00 PM   Output (mL) 300 mL 2019  4:15 PM   Number of days: 0       Anesthesia Type:   General    Operative Indications:  Bladder mass [N32 89]      Operative Findings:  3 cm bladder tumor near the posterior bladder wall and dome    Complications:   None    Procedure and Technique:  After informed consent was obtained the patient was brought to the operating room  Preoperative antibiotics were given for infection prophylaxis and bilateral sequential compressive devices were placed on lower extremities for DVT prophylaxis  A time-out was performed to identify the patient and surgery to be performed  The patient was then placed under general anesthesia and prepped and draped in standard sterile fashion in the dorsal lithotomy position  The patient had a very large scrotum secondary to hernia and severe phimosis which both made it very difficult to cannulate the urethra  I was able to get a 25 [place] cystoscope through the urethra and into the bladder and examined the bladder  There was a tumor noted but it was in the posterior wall close to the dome and was very difficult to reach    A wire was placed through the cystoscope and into the bladder  With a lot of difficulty I was able to eventually dilate the meatus and get a 26 [employer] [place] dual flow resectoscope into the bladder  There was some trauma to the urethra in doing so  Once I was in the bladder I was finally able to resect the tumor with loop electrocautery  Dr Nguyen Lemus scrubbed in to help press on the bladder and get the tumor closer to my resectoscope as it was very difficult to reach given his anatomy  Once the tumor was resected the resection bed was cauterized with electrocautery and no bleeding was noted under low pressure  The resectoscope was removed and a 20 [employer] [place] tip catheter was placed over the guidewire 40 mg of mitomycin-C in 40 mL of normal saline were instilled into the bladder and the Green catheter was clamped  The patient was woken and taken to the postanesthesia care unit stable condition     I was present for the entire procedure    Patient Disposition:  PACU     SIGNATURE: Yaa Witt MD  DATE: January 2, 2019  TIME: 5:20 PM

## 2019-01-03 ENCOUNTER — TELEPHONE (OUTPATIENT)
Dept: UROLOGY | Facility: AMBULATORY SURGERY CENTER | Age: 84
End: 2019-01-03

## 2019-01-03 NOTE — TELEPHONE ENCOUNTER
Patient's niece says she never got a call   I told her the appointment date and time on the 8th, and Gabbie wants to know if she can do post op appointment on same day as the rico removal? Please call her back

## 2019-01-03 NOTE — TELEPHONE ENCOUNTER
Spoke to [facility] who states she cannot get patient to appt on 1/15/19 to review path results  There are no other discussion spots available that week with Dr Jie Cartwright this will be sent to practice coordinator to assist in finding a spot

## 2019-01-03 NOTE — TELEPHONE ENCOUNTER
Patient's niece, Ari Koenig called to schedule an appointment to have his cath removed  Please return her call to schedule this nurse visit  ([phone])  Thank you!

## 2019-01-03 NOTE — TELEPHONE ENCOUNTER
Called Gabbie matos and CRYSTAL that Obie Valdovinos was scheduled for 8:30 Tues 1/8 for a rico removal s/p TURBT    He is also scheduled for discussion f/u Tues 1/15 @ 11:30

## 2019-01-07 NOTE — TELEPHONE ENCOUNTER
Spoke with Gabbie at [phone] and confirmed this appointment change  They will be able to bring Trung on 1/17/19

## 2019-01-08 ENCOUNTER — APPOINTMENT (OUTPATIENT)
Dept: LAB | Facility: CLINIC | Age: 84
End: 2019-01-08
Payer: MEDICARE

## 2019-01-08 ENCOUNTER — PROCEDURE VISIT (OUTPATIENT)
Dept: UROLOGY | Facility: AMBULATORY SURGERY CENTER | Age: 84
End: 2019-01-08
Payer: MEDICARE

## 2019-01-08 VITALS
DIASTOLIC BLOOD PRESSURE: 60 MMHG | SYSTOLIC BLOOD PRESSURE: 104 MMHG | BODY MASS INDEX: 37.1 KG/M2 | HEART RATE: 100 BPM | WEIGHT: 236.4 LBS | HEIGHT: 67 IN

## 2019-01-08 DIAGNOSIS — R31.0 GROSS HEMATURIA: ICD-10-CM

## 2019-01-08 DIAGNOSIS — I10 HYPERTENSION, ESSENTIAL: ICD-10-CM

## 2019-01-08 DIAGNOSIS — N32.89 BLADDER MASS: ICD-10-CM

## 2019-01-08 DIAGNOSIS — E11.649 UNCONTROLLED TYPE 2 DIABETES MELLITUS WITH HYPOGLYCEMIA, UNSPECIFIED HYPOGLYCEMIA COMA STATUS (HCC): ICD-10-CM

## 2019-01-08 LAB
ANION GAP SERPL CALCULATED.3IONS-SCNC: 7 MMOL/L (ref 4–13)
BUN SERPL-MCNC: 11 MG/DL (ref 5–25)
CALCIUM SERPL-MCNC: 9.1 MG/DL (ref 8.3–10.1)
CHLORIDE SERPL-SCNC: 101 MMOL/L (ref 100–108)
CO2 SERPL-SCNC: 30 MMOL/L (ref 21–32)
CREAT SERPL-MCNC: 0.99 MG/DL (ref 0.6–1.3)
GFR SERPL CREATININE-BSD FRML MDRD: 66 ML/MIN/1.73SQ M
GLUCOSE SERPL-MCNC: 105 MG/DL (ref 65–140)
POTASSIUM SERPL-SCNC: 4.1 MMOL/L (ref 3.5–5.3)
SODIUM SERPL-SCNC: 138 MMOL/L (ref 136–145)

## 2019-01-08 PROCEDURE — 36415 COLL VENOUS BLD VENIPUNCTURE: CPT

## 2019-01-08 PROCEDURE — 99211 OFF/OP EST MAY X REQ PHY/QHP: CPT

## 2019-01-08 PROCEDURE — 80048 BASIC METABOLIC PNL TOTAL CA: CPT

## 2019-01-08 NOTE — PROGRESS NOTES
1/8/2019    Adin Mallory is a 80 y o  male  [id number]    Diagnosis:  Chief Complaint     bladder tumor          Patient presents for rico catheter removal removal s/p TURBT  on 01/02/2019 with Dr Yolanda Park:  Patient will follow up as scheduled to discuss pathology results with Dr Candido Monroe    Procedure:  Patient presents with rico catheter draining yellow urine with slight pink tinge  Of note significant hernia/enlarged scrotum with buried penis noted  Catheter removed after deflation of an intact balloon  Patient tolerated well  Encouraged hydration and to call prior to the office closing with any difficulty voiding  Otherwise small amt of bleeding and burning can be expected after procedure and catheter removal   Patient ambulates with walker accompanied by family member/caregiver       Vitals:    01/08/19 0835   BP: 104/60   Pulse: 100   Weight: 107 kg (236 lb 6 4 oz)   Height: 5' 7" (1 702 m)         DUNIA Adrian BSN

## 2019-01-17 ENCOUNTER — OFFICE VISIT (OUTPATIENT)
Dept: UROLOGY | Facility: AMBULATORY SURGERY CENTER | Age: 84
End: 2019-01-17
Payer: MEDICARE

## 2019-01-17 VITALS
HEART RATE: 68 BPM | BODY MASS INDEX: 36.88 KG/M2 | HEIGHT: 67 IN | WEIGHT: 235 LBS | SYSTOLIC BLOOD PRESSURE: 130 MMHG | DIASTOLIC BLOOD PRESSURE: 68 MMHG

## 2019-01-17 DIAGNOSIS — C67.4 MALIGNANT NEOPLASM OF POSTERIOR WALL OF URINARY BLADDER (HCC): Primary | ICD-10-CM

## 2019-01-17 PROCEDURE — 1124F ACP DISCUSS-NO DSCNMKR DOCD: CPT | Performed by: UROLOGY

## 2019-01-17 PROCEDURE — 99214 OFFICE O/P EST MOD 30 MIN: CPT | Performed by: UROLOGY

## 2019-01-17 NOTE — ASSESSMENT & PLAN NOTE
I reviewed the pathology results with the patient and his family  We discussed that he has high-grade T1 bladder cancer  We discussed that the standard of care would be to proceed with another resection to make sure the cancer has been completely removed and to resample the muscle  We also discussed possible BCG therapy  The risks and benefits of this were discussed  Given his advanced age the patient does not want to proceed with another surgery or to proceed with BCG treatments  They will discuss this further at home  In the meantime we will schedule him in 3 months for another cystoscopy

## 2019-01-17 NOTE — PROGRESS NOTES
Assessment/Plan:    Malignant neoplasm of posterior wall of urinary bladder ([address] )  I reviewed the pathology results with the patient and his family  We discussed that he has high-grade T1 bladder cancer  We discussed that the standard of care would be to proceed with another resection to make sure the cancer has been completely removed and to resample the muscle  We also discussed possible BCG therapy  The risks and benefits of this were discussed  Given his advanced age the patient does not want to proceed with another surgery or to proceed with BCG treatments  They will discuss this further at home  In the meantime we will schedule him in 3 months for another cystoscopy  Diagnoses and all orders for this visit:    Malignant neoplasm of posterior wall of urinary bladder ([address] )    Other orders  -     Cancel: Cystoscopy          Total visit time was 25 minutes of which over 50% was spent on counseling  Subjective:     Patient ID: David Caro is a 80 y o  male    70-year-old male presents for follow-up after transurethral resection of bladder tumor  The patient states he is doing fine and denies any gross hematuria  He has no complaints and is here with his granddaughter to discuss the results  The following portions of the patient's history were reviewed and updated as appropriate: allergies, current medications, past family history, past medical history, past social history, past surgical history and problem list     Review of Systems   Constitutional: Negative  HENT: Negative  Eyes: Negative  Respiratory: Negative  Cardiovascular: Negative  Gastrointestinal: Negative  Endocrine: Negative  Genitourinary:        As noted per HPI   Musculoskeletal: Negative  Skin: Negative  Allergic/Immunologic: Negative  Neurological: Negative  Hematological: Negative  Psychiatric/Behavioral: Negative                  Objective:    Physical Exam   Constitutional: He is oriented to person, place, and time  He appears well-developed and well-nourished  Neck: Normal range of motion  Cardiovascular: Intact distal pulses  Pulmonary/Chest: Effort normal    Abdominal: Soft  Bowel sounds are normal  He exhibits no distension and no mass  There is no tenderness  There is no rebound and no guarding  Musculoskeletal: Normal range of motion  Neurological: He is alert and oriented to person, place, and time  Skin: Skin is warm and dry  Psychiatric: He has a normal mood and affect  Vitals reviewed          Results  No results found for: PSA  Lab Results   Component Value Date    CALCIUM 9 1 01/08/2019    K 4 1 01/08/2019    CO2 30 01/08/2019     01/08/2019    BUN 11 01/08/2019    CREATININE 0 99 01/08/2019     Lab Results   Component Value Date    WBC 7 37 12/01/2018    HGB 12 4 12/01/2018    HCT 39 3 12/01/2018    MCV 99 (H) 12/01/2018     12/01/2018       No results found for this or any previous visit (from the past 1 hour(s)) ]

## 2019-04-03 NOTE — ED PROVIDER NOTES
History  Chief Complaint   Patient presents with   Tricia Parisi Fall     pt has been falling more than usual  family states that pt has not been as steady on his feet as he usually is  pt reports bi lat knee pain   Gait Problem     This is a 80 y o  old male who presents to the ED for evaluation of falls  Patient reports gradually worsening weakness and difficulty ambulating  Frequent falls at home  Has had multiple episodes of diarrhea over this period of time  No nausea or vomiting  Has fallen a couple times on his knees  Small abrasions on his knees  No cough congestion rhinorrhea  No urinary symptoms  Prior to Admission Medications   Prescriptions Last Dose Informant Patient Reported? Taking?   allopurinol (ZYLOPRIM) 100 mg tablet 11/23/2017 at Unknown time  Yes Yes   Sig: Take by mouth daily     aspirin (ECOTRIN LOW STRENGTH) 81 mg EC tablet 11/23/2017 at Unknown time  Yes Yes   Sig: Take 81 mg by mouth daily   doxazosin (CARDURA) 1 mg tablet   Yes Yes   Sig: Take by mouth daily at bedtime     famotidine (PEPCID) 10 mg tablet 11/23/2017 at Unknown time  Yes Yes   Sig: Take by mouth 2 (two) times a day     furosemide (LASIX) 20 mg tablet 11/23/2017 at Unknown time  Yes Yes   Sig: Take by mouth 2 (two) times a day     lovastatin (MEVACOR) 10 MG tablet  Self Yes Yes   Sig: Take by mouth daily at bedtime     tamsulosin (FLOMAX) 0 4 mg  Self Yes Yes   Sig: Take by mouth daily with dinner        Facility-Administered Medications: None     Past Medical History:   Diagnosis Date    Atrial fibrillation ([address] )     Stroke [facility])      Past Surgical History:   Procedure Laterality Date    BACK SURGERY      CARDIAC SURGERY      pacemaker placement      History reviewed  No pertinent family history  I have reviewed and agree with the history as documented      Social History   Substance Use Topics    Smoking status: Never Smoker    Smokeless tobacco: Never Used    Alcohol use No      Review of Systems   Constitutional: Positive for activity change, fatigue and fever  Negative for chills and unexpected weight change  HENT: Negative for congestion, rhinorrhea and sore throat  Eyes: Negative for redness and visual disturbance  Respiratory: Negative for cough and shortness of breath  Cardiovascular: Negative for chest pain and leg swelling  Gastrointestinal: Positive for diarrhea  Negative for abdominal pain, constipation, nausea and vomiting  Endocrine: Negative for cold intolerance and heat intolerance  Genitourinary: Negative for dysuria, frequency and urgency  Musculoskeletal: Positive for gait problem  Negative for back pain  Skin: Negative for rash  Neurological: Negative for dizziness, syncope, weakness and numbness  All other systems reviewed and are negative  Physical Exam  ED Triage Vitals   Temperature Pulse Respirations Blood Pressure SpO2   11/23/17 1453 11/23/17 1450 11/23/17 1450 11/23/17 1450 11/23/17 1450   99 8 °F (37 7 °C) 92 18 154/75 95 %      Temp Source Heart Rate Source Patient Position - Orthostatic VS BP Location FiO2 (%)   11/23/17 1453 11/23/17 1450 11/23/17 1450 11/23/17 1450 --   Oral Monitor Sitting Right arm       Pain Score       11/23/17 1730       No Pain         Physical Exam   Constitutional: He is oriented to person, place, and time  He appears well-developed and well-nourished  No distress  HENT:   Head: Normocephalic and atraumatic  Nose: Nose normal    Eyes: Conjunctivae and EOM are normal  Pupils are equal, round, and reactive to light  Neck: Normal range of motion  Neck supple  Cardiovascular: Normal rate, regular rhythm and normal heart sounds  Exam reveals no gallop  No murmur heard  Pulmonary/Chest: Effort normal and breath sounds normal  No respiratory distress  He has no wheezes  He has no rales  Abdominal: Soft  Bowel sounds are normal  He exhibits no distension and no mass  There is no tenderness  There is no rebound and no guarding  Genitourinary:   Genitourinary Comments: Large bilateral inguinal hernias   Musculoskeletal: Normal range of motion  He exhibits edema (2+ bilateral to the knees)  He exhibits no deformity  Lymphadenopathy:     He has no cervical adenopathy  Neurological: He is alert and oriented to person, place, and time  No cranial nerve deficit  Skin: Skin is warm and dry  No rash noted  He is not diaphoretic  No erythema  Psychiatric: He has a normal mood and affect  Nursing note and vitals reviewed  ED Medications  Medications   ondansetron (ZOFRAN) injection 4 mg (not administered)   sodium chloride 0 9 % infusion (not administered)   heparin (porcine) subcutaneous injection 5,000 Units (not administered)   sodium chloride 0 9 % bolus 1,000 mL (0 mL Intravenous Stopped 11/23/17 1725)   acetaminophen (TYLENOL) tablet 975 mg (975 mg Oral Given 11/23/17 1533)   sodium chloride 0 9 % bolus 1,000 mL (0 mL Intravenous Stopped 11/23/17 1953)   iodixanol (VISIPAQUE) 320 MG/ML injection 100 mL (100 mL Intravenous Given 11/23/17 1639)     Diagnostic Studies  Results Reviewed     Procedure Component Value Units Date/Time    Lactic acid x2 Q2H [[id number]]  (Normal) Collected:  11/23/17 1952    Lab Status:  Final result Specimen:  Blood from Arm, Left Updated:  11/23/17 2017     LACTIC ACID 2 0 mmol/L     Narrative:         Result may be elevated if tourniquet was used during collection  Lactic acid x2 Q2H [[id number]]     Lab Status:  No result Specimen:  Blood     Lactic acid x2 [[id number]]  (Abnormal) Collected:  11/23/17 1735    Lab Status:  Final result Specimen:  Blood from Arm, Left Updated:  11/23/17 1811     LACTIC ACID 3 1 (HH) mmol/L     Narrative:         Result may be elevated if tourniquet was used during collection      Urine Microscopic [[id number]]  (Abnormal) Collected:  11/23/17 1548    Lab Status:  Final result Specimen:  Urine from Urine, Clean Catch Updated:  11/23/17 1621     RBC, UA 2-4 (A) /hpf WBC, UA None Seen /hpf      Epithelial Cells Occasional /hpf      Bacteria, UA Occasional /hpf     Lactic acid x2 [[id number]]  (Abnormal) Collected:  11/23/17 1527    Lab Status:  Final result Specimen:  Blood from Arm, Left Updated:  11/23/17 1603     LACTIC ACID 3 8 (HH) mmol/L     Narrative:         Result may be elevated if tourniquet was used during collection  Comprehensive metabolic panel [[id number]]  (Abnormal) Collected:  11/23/17 1527    Lab Status:  Final result Specimen:  Blood from Arm, Left Updated:  11/23/17 1600     Sodium 139 mmol/L      Potassium 3 5 mmol/L      Chloride 104 mmol/L      CO2 26 mmol/L      Anion Gap 9 mmol/L      BUN 20 mg/dL      Creatinine 1 49 (H) mg/dL      Glucose 147 (H) mg/dL      Calcium 8 5 mg/dL      AST 18 U/L      ALT 15 U/L      Alkaline Phosphatase 65 U/L      Total Protein 7 4 g/dL      Albumin 3 2 (L) g/dL      Total Bilirubin 1 02 (H) mg/dL      eGFR 40 ml/min/1 73sq m     Narrative:         National Kidney Disease Education Program recommendations are as follows:  GFR calculation is accurate only with a steady state creatinine  Chronic Kidney disease less than 60 ml/min/1 73 sq  meters  Kidney failure less than 15 ml/min/1 73 sq  meters  Lipase [[id number]]  (Normal) Collected:  11/23/17 1527    Lab Status:  Final result Specimen:  Blood from Arm, Left Updated:  11/23/17 1600     Lipase 131 u/L     Magnesium [[id number]]  (Normal) Collected:  11/23/17 1527    Lab Status:  Final result Specimen:  Blood from Arm, Left Updated:  11/23/17 1600     Magnesium 1 9 mg/dL     Protime-INR [[id number]]  (Normal) Collected:  11/23/17 1527    Lab Status:  Final result Specimen:  Blood from Arm, Left Updated:  11/23/17 1557     Protime 14 0 seconds      INR 1 08    APTT [[id number]]  (Normal) Collected:  11/23/17 1527    Lab Status:  Final result Specimen:  Blood from Arm, Left Updated:  11/23/17 1557     PTT 29 seconds     Narrative:          Therapeutic Heparin Range = 60-90 seconds    POCT troponin [[id number]]  (Normal) Collected:  11/23/17 1540    Lab Status:  Final result Updated:  11/23/17 1554     POC Troponin I 0 03 ng/ml      Specimen Type VENOUS    Narrative:         [employer] i-Stat handheld analyzer 99% cutoff is > 0 08ng/mL in [facility]    o cTnI 99% cutoff is useful only when applied to patients in the clinical setting of myocardial ischemia  o cTnI 99% cutoff should be interpreted in the context of clinical history, ECG findings and possibly cardiac imaging to establish correct diagnosis  o cTnI 99% cutoff may be suggestive but clearly not indicative of a coronary event without the clinical setting of myocardial ischemia      POCT urinalysis dipstick [[id number]]  (Normal) Resulted:  11/23/17 1551    Lab Status:  Final result Specimen:  Urine Updated:  11/23/17 1551     Color, UA completed    ED Urine Macroscopic [[id number]]  (Abnormal) Collected:  11/23/17 1548    Lab Status:  Final result Specimen:  Urine Updated:  11/23/17 1550     Color, UA Yellow     Clarity, UA Clear     pH, UA 5 5     Leukocytes, UA Negative     Nitrite, UA Negative     Protein,  (2+) (A) mg/dl      Glucose, UA Negative mg/dl      Ketones, UA Negative mg/dl      Urobilinogen, UA 0 2 E U /dl      Bilirubin, UA Negative     Blood, UA Large (A)     Specific [place], UA 1 025    Narrative:       CLINITEK RESULT    CBC and differential [[id number]]  (Abnormal) Collected:  11/23/17 1527    Lab Status:  Final result Specimen:  Blood from Arm, Left Updated:  11/23/17 1543     WBC 7 50 Thousand/uL      RBC 4 07 Million/uL      Hemoglobin 13 2 g/dL      Hematocrit 38 5 %      MCV 95 fL      MCH 32 4 pg      MCHC 34 3 g/dL      RDW 13 7 %      MPV 10 1 fL      Platelets 934 Thousands/uL      nRBC 0 /100 WBCs      Neutrophils Relative 86 (H) %      Lymphocytes Relative 5 (L) %      Monocytes Relative 9 %      Eosinophils Relative 0 %      Basophils Relative 0 %      Neutrophils Absolute 6 44 Thousands/µL      Lymphocytes Absolute 0 37 (L) Thousands/µL      Monocytes Absolute 0 66 Thousand/µL      Eosinophils Absolute 0 00 Thousand/µL      Basophils Absolute 0 01 Thousands/µL     Blood culture #2 [[id number]] Collected:  11/23/17 1527    Lab Status: In process Specimen:  Blood from Arm, Right Updated:  11/23/17 1535    Blood culture #1 [[id number]] Collected:  11/23/17 1526    Lab Status: In process Specimen:  Blood from Arm, Left Updated:  11/23/17 1535        CT abdomen pelvis w contrast   Final Result by See Jaimes MD (11/23 1723)      Diffuse fluid content throughout the colon, no focal inflammatory changes or evidence of obstruction demonstrated  Incompletely visualized large bilateral inguinal hernias containing small bowel on the RIGHT and: On the LEFT  Hiatal hernia  Bladder wall thickening, mild asymmetric thickened on the RIGHT of uncertain significance, limited evaluation given mild distention  Can be related to chronic outlet obstructive changes  Workstation performed: [id number]         CT head wo contrast   ED Interpretation by Marcio Limon MD (11/23 7662)   No evidence of acute intracranial pathology, as interpreted by me  Final Result by See Jaimes MD (11/23 0879)      No acute intracranial abnormality  Microangiopathic changes  Atrophy  Workstation performed: [id number]         XR chest pa & lateral   Final Result by See Jaimes MD (11/23 0604)      Limited as above  Cardiomegaly  Mild hazy opacity at the LEFT lung base as above           Workstation performed: [id number]           Procedures  ECG 12 Lead Documentation  Date/Time: 11/23/2017 3:30 PM  Performed by: Gay Macias  Authorized by: Liborio Brandon     Indications / Diagnosis:  Sepsis  ECG reviewed by me, the ED Provider: yes    Patient location:  ED  Previous ECG:     Previous ECG:  Unavailable  Interpretation:     Interpretation: abnormal    Rate:     ECG rate: 80  Rhythm:     Rhythm: atrial fibrillation    Ectopy:     Ectopy: none    QRS:     QRS axis:  Normal  Conduction:     Conduction: normal    ST segments:     ST segments:  Non-specific  T waves:     T waves: non-specific        Phone Consults  ED Phone Contact    ED Course    A/P: This is a 80 y o  male who presents to the ED for evaluation of falls and diarrhea  Patient has a fever  Will get septic evaluation  CTH, CT A/P  CXR  IVF  Admit to medicine  Elevated Lactic  Continue IVF 2nd Liter  Will still get CT with contrast even with GFR of 40 given concern for infection  Patient has CT showing gastroenteritis, c/w history  Lactic trending down  This patient's sepsis is viral - no antibiotics indicated  Admit to medicine for weakness, diarrhea, IV hydration  MDM  CritCare Time    Disposition  Final diagnoses:   Gastroenteritis   Fall, initial encounter   Dehydration     Time reflects when diagnosis was documented in both MDM as applicable and the Disposition within this note     Time User Action Codes Description Comment    11/23/2017  7:42 PM Garcia Sprinkles Add [K52 9] Gastroenteritis     11/23/2017  7:42 PM Garcia Sprinkles Add [F65  MWBG] Fall, initial encounter     11/23/2017  7:42 PM Garcia Sprinkles Add [E86 0] Dehydration       ED Disposition     ED Disposition Condition Comment    Admit  Case was discussed with ALEJANDRINA and the patient's admission status was agreed to be Admission Status: observation status to the service of Dr Alicia Fuentes          Follow-up Information    None       Current Discharge Medication List      CONTINUE these medications which have NOT CHANGED    Details   allopurinol (ZYLOPRIM) 100 mg tablet Take by mouth daily        aspirin (ECOTRIN LOW STRENGTH) 81 mg EC tablet Take 81 mg by mouth daily      doxazosin (CARDURA) 1 mg tablet Take by mouth daily at bedtime        famotidine (PEPCID) 10 mg tablet Take by mouth 2 (two) times a day        furosemide (LASIX) 20 mg tablet Take by mouth 2 (two) times a day        lovastatin (MEVACOR) 10 MG tablet Take by mouth daily at bedtime        tamsulosin (FLOMAX) 0 4 mg Take by mouth daily with dinner             No discharge procedures on file  ED Provider  Attending physically available and evaluated Eve Rater  I managed the patient along with the ED Attending      Electronically Signed by         Pedro Luis Medel MD  Resident  11/23/17 7204 0 = swallows foods/liquids without difficulty

## 2019-04-10 ENCOUNTER — TELEPHONE (OUTPATIENT)
Dept: UROLOGY | Facility: AMBULATORY SURGERY CENTER | Age: 84
End: 2019-04-10

## 2022-01-01 ENCOUNTER — TELEPHONE (OUTPATIENT)
Dept: UROLOGY | Facility: AMBULATORY SURGERY CENTER | Age: 87
End: 2022-01-01

## 2022-01-01 ENCOUNTER — TELEPHONE (OUTPATIENT)
Dept: UROLOGY | Facility: CLINIC | Age: 87
End: 2022-01-01

## 2022-01-01 ENCOUNTER — OFFICE VISIT (OUTPATIENT)
Dept: UROLOGY | Facility: CLINIC | Age: 87
End: 2022-01-01
Payer: MEDICARE

## 2022-01-01 ENCOUNTER — OFFICE VISIT (OUTPATIENT)
Dept: UROLOGY | Facility: AMBULATORY SURGERY CENTER | Age: 87
End: 2022-01-01
Payer: MEDICARE

## 2022-01-01 VITALS
OXYGEN SATURATION: 99 % | WEIGHT: 227 LBS | DIASTOLIC BLOOD PRESSURE: 72 MMHG | SYSTOLIC BLOOD PRESSURE: 116 MMHG | HEART RATE: 77 BPM | BODY MASS INDEX: 36.48 KG/M2 | RESPIRATION RATE: 16 BRPM | HEIGHT: 66 IN

## 2022-01-01 VITALS
WEIGHT: 225 LBS | SYSTOLIC BLOOD PRESSURE: 116 MMHG | HEART RATE: 86 BPM | DIASTOLIC BLOOD PRESSURE: 74 MMHG | HEIGHT: 66 IN | BODY MASS INDEX: 36.16 KG/M2

## 2022-01-01 DIAGNOSIS — N40.1 BENIGN LOCALIZED PROSTATIC HYPERPLASIA WITH LOWER URINARY TRACT SYMPTOMS (LUTS): Primary | ICD-10-CM

## 2022-01-01 DIAGNOSIS — N47.1 PHIMOSIS OF PENIS: ICD-10-CM

## 2022-01-01 DIAGNOSIS — C67.4 MALIGNANT NEOPLASM OF POSTERIOR WALL OF URINARY BLADDER (HCC): Primary | ICD-10-CM

## 2022-01-01 DIAGNOSIS — N40.1 BENIGN LOCALIZED PROSTATIC HYPERPLASIA WITH LOWER URINARY TRACT SYMPTOMS (LUTS): ICD-10-CM

## 2022-01-01 DIAGNOSIS — N39.0 FREQUENT UTI: ICD-10-CM

## 2022-01-01 PROCEDURE — 99213 OFFICE O/P EST LOW 20 MIN: CPT | Performed by: PHYSICIAN ASSISTANT

## 2022-01-01 PROCEDURE — 52000 CYSTOURETHROSCOPY: CPT | Performed by: UROLOGY

## 2022-01-01 PROCEDURE — 99214 OFFICE O/P EST MOD 30 MIN: CPT | Performed by: UROLOGY

## 2022-01-01 PROCEDURE — 96372 THER/PROPH/DIAG INJ SC/IM: CPT

## 2022-01-01 PROCEDURE — 51702 INSERT TEMP BLADDER CATH: CPT | Performed by: PHYSICIAN ASSISTANT

## 2022-01-01 RX ORDER — DOXAZOSIN 2 MG/1
2 TABLET ORAL DAILY
COMMUNITY
Start: 2022-01-01

## 2022-01-01 RX ORDER — CEFTRIAXONE 1 G/1
1000 INJECTION, POWDER, FOR SOLUTION INTRAMUSCULAR; INTRAVENOUS ONCE
Status: COMPLETED | OUTPATIENT
Start: 2022-01-01 | End: 2022-01-01

## 2022-01-01 RX ORDER — LORAZEPAM 1 MG/1
TABLET ORAL
COMMUNITY
Start: 2022-01-01

## 2022-01-01 RX ORDER — LACTULOSE 10 G/15ML
SOLUTION ORAL; RECTAL
COMMUNITY
Start: 2022-01-01

## 2022-01-01 RX ORDER — MORPHINE SULFATE 20 MG/ML
SOLUTION ORAL
COMMUNITY
Start: 2022-01-01

## 2022-01-01 RX ORDER — NITROFURANTOIN 25; 75 MG/1; MG/1
CAPSULE ORAL
COMMUNITY
Start: 2022-01-01

## 2022-01-01 RX ORDER — PROCHLORPERAZINE 25 MG/1
SUPPOSITORY RECTAL
COMMUNITY
Start: 2022-01-01

## 2022-01-01 RX ORDER — BISACODYL 10 MG
SUPPOSITORY, RECTAL RECTAL
COMMUNITY
Start: 2022-01-01

## 2022-01-01 RX ORDER — OXYCODONE HYDROCHLORIDE AND ACETAMINOPHEN 5; 325 MG/1; MG/1
TABLET ORAL
COMMUNITY
Start: 2022-01-01

## 2022-01-01 RX ORDER — SCOLOPAMINE TRANSDERMAL SYSTEM 1 MG/1
PATCH, EXTENDED RELEASE TRANSDERMAL
COMMUNITY
Start: 2022-01-01

## 2022-01-01 RX ORDER — HALOPERIDOL 1 MG/1
TABLET ORAL
COMMUNITY
Start: 2022-01-01

## 2022-01-01 RX ADMIN — CEFTRIAXONE 1000 MG: 1 INJECTION, POWDER, FOR SOLUTION INTRAMUSCULAR; INTRAVENOUS at 16:10

## 2022-02-16 PROBLEM — N32.89 BLADDER MASS: Status: RESOLVED | Noted: 2018-11-29 | Resolved: 2022-01-01

## 2022-02-16 PROBLEM — N47.1 PHIMOSIS OF PENIS: Status: ACTIVE | Noted: 2022-01-01

## 2022-02-16 PROBLEM — N40.1 BENIGN LOCALIZED PROSTATIC HYPERPLASIA WITH LOWER URINARY TRACT SYMPTOMS (LUTS): Status: ACTIVE | Noted: 2022-01-01

## 2022-02-16 NOTE — PROGRESS NOTES
Assessment/Plan:    Malignant neoplasm of posterior wall of urinary bladder (HCC)  The patient has a history of high-grade T1 bladder cancer  No obvious lesions seen today  Would recommend repeat cystoscopy in 6-12 months  Given his age and comorbidities and that he is overall doing well think it is safe to wait 12 months    Phimosis of penis  Patient with long-standing phimosis but since he is overall  We voiding well do not plan to intervene at this time    Frequent UTI  Rocephin was given today given history of UTI in the past as well as difficult cystoscopy with almost impossible nature to make sterile given his severe phimosis and buried penis    Benign localized prostatic hyperplasia with lower urinary tract symptoms (LUTS)  The patient has evidence of outlet obstruction based on the appearance of his bladder but he is voiding  He has required catheters in the past   Most recent imaging was ultrasound 3 years ago which did not show evidence of hydronephrosis  He could not void to allow us to check a PVR today  Plan for follow-up in 3 months with an AP to check PVR  Subjective:      Patient ID: So Yepez is a 80 y o  male  HPI  80-year-old male with history of high-grade T1 bladder cancer  The patient had TURBT in 2019 with pathology showing high-grade T1 bladder cancer  He did not want a re-resection or BCG therapy  Therefore his recommended have surveillance cystoscopy but never followed up  He now returns  He has with his grandson whom he lives with  The patient states he denies any gross hematuria but micro hematuria was found at the PCP visit recently (outside institution)  Hhemy does have some difficulty voiding which is longstanding and is on Flomax and uses a diaper        Past Surgical History:   Procedure Laterality Date    BACK SURGERY      CARDIAC SURGERY      pacemaker placement     INSERT / REPLACE / REMOVE PACEMAKER      [facility] CYSTOURETHROSCOPY,FULGUR 0 5-2 CM SHYANN N/A 1/2/2019    Procedure: TRANSURETHRAL RESECTION OF BLADDER TUMOR (TURBT); Surgeon: Monisha Lazo MD;  Location: [facility];  Service: Urology        Past Medical History:   Diagnosis Date    [facility]rial fibrillation ([facility] )     [facility] )     GERD (gastroesophageal reflux disease)     History of transfusion     2012    Hyperlipidemia     Hypertension     Pneumonia     Stroke ([facility] )     TIA in 2017             Review of Systems   Constitutional: Negative for chills and fever  HENT: Negative for ear pain and sore throat  Eyes: Negative for pain and visual disturbance  Respiratory: Negative for cough and shortness of breath  Cardiovascular: Negative for chest pain and palpitations  Gastrointestinal: Negative for abdominal pain and vomiting  Genitourinary: Positive for difficulty urinating and scrotal swelling  Negative for dysuria and hematuria  Musculoskeletal: Positive for arthralgias and back pain  Skin: Negative for color change and rash  Neurological: Negative for seizures and syncope  All other systems reviewed and are negative  Objective:      /74   Pulse 86   Ht 5' 6" (1 676 m)   Wt 102 kg (225 lb)   BMI 36 32 kg/m²     No results found for: PSA       Physical Exam  Constitutional:       Appearance: Normal appearance  HENT:      Head: Normocephalic and atraumatic  Eyes:      Extraocular Movements: Extraocular movements intact  Pupils: Pupils are equal, round, and reactive to light  Cardiovascular:      Rate and Rhythm: Normal rate  Abdominal:      General: Abdomen is flat  There is no distension  Palpations: There is no mass  Tenderness: There is no abdominal tenderness  There is no guarding  Hernia: A hernia is present  Genitourinary:     Comments: The penis is buried 2nd to massive bilateral inguinal hernia    When it is attempted to be found by pushing back surrounding tissue there is obvious significant phimosis  Musculoskeletal:      Right lower leg: Edema present  Left lower leg: Edema present  Skin:     General: Skin is warm and dry  Coloration: Skin is not jaundiced  Findings: Erythema present  No bruising  Neurological:      General: No focal deficit present  Mental Status: He is alert and oriented to person, place, and time  Mental status is at baseline  Psychiatric:         Mood and Affect: Mood normal          Thought Content: Thought content normal          Judgment: Judgment normal                Cystoscopy     Date/Time 2/16/2022 4:19 PM     Performed by  Navneet Sanon MD     Authorized by Navneet Sanon MD      Universal Protocol:  Consent: Written consent obtained  Risks and benefits: risks, benefits and alternatives were discussed  Consent given by: patient  Time out: Immediately prior to procedure a "time out" was called to verify the correct patient, procedure, equipment, support staff and site/side marked as required  Patient understanding: patient states understanding of the procedure being performed  Patient consent: the patient's understanding of the procedure matches consent given  Procedure consent: procedure consent matches procedure scheduled  Patient identity confirmed: verbally with patient        Procedure Details:  Procedure type: cystoscopy    Patient tolerance: Patient tolerated the procedure well with no immediate complications    Additional Procedure Details: A time-out was performed identifying the correct patient site and procedure  A MA chaperone was in the room  A flexible cystoscope was introduced into the urethra after some difficulty locating the penis and gently dilating the phimotic foreskin to allow access to the glans  The pendulous urethra was normal   The prostatic urethra showed mild bilateral lobar hypertrophy without a median lobe  The bladder did not have any lesions concerning for malignancy    There was an area of pinpoint erythema near the trigone but this appeared to be a hypervascular area  There were severe trabeculations and a few diverticula  The ureteral orifices were in orthotopic position      Orders  Orders Placed This Encounter   Procedures    Cystoscopy     This order was created via procedure documentation

## 2022-02-16 NOTE — ASSESSMENT & PLAN NOTE
Rocephin was given today given history of UTI in the past as well as difficult cystoscopy with almost impossible nature to make sterile given his severe phimosis and buried penis

## 2022-02-16 NOTE — ASSESSMENT & PLAN NOTE
The patient has a history of high-grade T1 bladder cancer  No obvious lesions seen today  Would recommend repeat cystoscopy in 6-12 months    Given his age and comorbidities and that he is overall doing well think it is safe to wait 12 months

## 2022-02-16 NOTE — ASSESSMENT & PLAN NOTE
Patient with long-standing phimosis but since he is overall    We voiding well do not plan to intervene at this time

## 2022-03-02 NOTE — TELEPHONE ENCOUNTER
Patient last seen by Dr Venessa Pavon in [facility] is calling to ask for an appointment  Stated he needs to get catheter in due to decreased mobility issues  Patient is having difficulty getting to bathroom and its becoming a challenge  Has lots of urinary incontinence

## 2022-03-03 NOTE — TELEPHONE ENCOUNTER
Spoke with Celestina Catalinakaya his niece and she said a condom catheter won't work as he has an "inniy penis" and it was difficult for catheter placement but that Dr Marcelle Le was able do what was needed  She is requesting a regular catheter because of his severe incontinence She also asked to go to [place] as office is closer

## 2022-03-04 NOTE — TELEPHONE ENCOUNTER
Scheduled patient for 3/9/22 on Can's schedule and RN will assist with catheter insertion  Please confirm appt with patient

## 2022-04-18 NOTE — PROGRESS NOTES
Universal Protocol:  Timeout called at: 3/9/2022 2:00 PM   Patient understanding: patient states understanding of the procedure being performed  Patient identity confirmed: verbally with patient      Bladder catheterization    Date/Time: 3/9/2022 2:00 PM  Performed by: Jany Gloria PA-C  Authorized by: Jany Gloria PA-C     Universal protocol:     Procedure explained and questions answered to patient or proxy's satisfaction: yes      Patient identity confirmed:  Verbally with patient  Anesthesia (see MAR for exact dosages): Anesthesia method:  Topical application    Topical anesthetic:  Lidocaine gel  Procedure details:     Catheter insertion:  Indwelling    Catheter size:  16 Fr    Number of attempts:  1    Successful placement: yes      Urine characteristics:  Clear    Provider performed due to: Altered anatomy    Altered anatomy:  Phimosis  Post-procedure details:     Patient tolerance of procedure: Tolerated well, no immediate complications  Comments:       Follow up for cath change in 4-6 weeks

## 2022-04-18 NOTE — PROGRESS NOTES
UROLOGY PROGRESS NOTE   Patient Identifiers: Jorge Wan (MRN [id number])  Date of Service:03/09/2022    Subjective:     77-year-old man with history of bladder cancer and phimosis  He has decreased mobility issues and the family called requesting Green catheter  He has urinary incontinence  He also has a fairly tight phimosis  A cystoscopy done in February showed no new lesions        Reason for visit:  Need for Green catheter and tight phimosis     Objective:     VITALS:    Vitals:    03/09/22 1403   BP: 116/72   Pulse: 77   Resp: 16   SpO2: 99%           LABS:  Lab Results   Component Value Date    HGB 12 4 12/01/2018    HCT 39 3 12/01/2018    WBC 7 37 12/01/2018     12/01/2018   ]    Lab Results   Component Value Date    K 4 1 01/08/2019     01/08/2019    CO2 30 01/08/2019    BUN 11 01/08/2019    CREATININE 0 99 01/08/2019    CALCIUM 9 1 01/08/2019   ]        INPATIENT MEDS:    Current Outpatient Medications:     acetaminophen (TYLENOL) 500 mg tablet, Take 500 mg by mouth every 6 (six) hours as needed for mild pain, Disp: , Rfl:     allopurinol (ZYLOPRIM) 100 mg tablet, Take 100 mg by mouth daily in the early morning  , Disp: , Rfl:     bisacodyl (DULCOLAX) 10 mg suppository, , Disp: , Rfl:     Compro 25 MG suppository, , Disp: , Rfl:     docusate sodium (COLACE) 100 mg capsule, Take 100 mg by mouth as needed  , Disp: , Rfl:     doxazosin (CARDURA) 1 mg tablet, Take 2 mg by mouth daily in the early morning  , Disp: , Rfl:     doxazosin (CARDURA) 2 mg tablet, Take 2 mg by mouth daily, Disp: , Rfl:     furosemide (LASIX) 20 mg tablet, Take 20 mg by mouth every other day M-W-F  EOD on week days , Disp: , Rfl:     guaiFENesin (ROBITUSSIN) 100 MG/5ML oral liquid, Take 200 mg by mouth 3 (three) times a day as needed for cough, Disp: , Rfl:     haloperidol (HALDOL) 1 mg tablet, , Disp: , Rfl:     hyoscyamine (LEVSIN/SL) 0 125 mg SL tablet, , Disp: , Rfl:     lactulose 10 g/15 mL, , Disp: , Rfl:     lisinopril (ZESTRIL) 2 5 mg tablet, Take 2 5 mg by mouth daily in the early morning, Disp: , Rfl:     LORazepam (ATIVAN) 1 mg tablet, , Disp: , Rfl:     lovastatin (MEVACOR) 20 mg tablet, Take 20 mg by mouth daily with dinner  , Disp: , Rfl:     morphine sulfate, concentrate, 100 mg/5mL concentrated solution, , Disp: , Rfl:     nitrofurantoin (MACROBID) 100 mg capsule, TAKE 1 CAPSULE BY MOUTH TWICE PER DAY FOR 7 DAYS, Disp: , Rfl:     oxyCODONE-acetaminophen (PERCOCET) 5-325 mg per tablet, , Disp: , Rfl:     Polyethylene Glycol 3350 ([employer] PURELAX PO), Take by mouth if needed, Disp: , Rfl:     scopolamine (TRANSDERM-SCOP) 1 mg/3 days TD 72 hr patch, , Disp: , Rfl:     tamsulosin (FLOMAX) 0 4 mg, Take 0 4 mg by mouth daily with dinner  , Disp: , Rfl:     aspirin (ECOTRIN LOW STRENGTH) 81 mg EC tablet, Take 81 mg by mouth daily in the early morning   (Patient not taking: Reported on 2/16/2022 ), Disp: , Rfl:     metFORMIN (GLUCOPHAGE) 500 mg tablet, Take 500 mg by mouth daily with dinner (Patient not taking: Reported on 2/16/2022 ), Disp: , Rfl:       Physical Exam:   /72   Pulse 77   Resp 16   Ht 5' 6" (1 676 m)   Wt 103 kg (227 lb)   SpO2 99%   BMI 36 64 kg/m²   GEN: no acute distress    RESP: breathing comfortably with no accessory muscle use    ABD: soft, non-tender, non-distended   INCISION:    EXT: no significant peripheral edema   (Male): Penis uncircumcised, phallus normal, meatus patent  Testicles descended into scrotum bilaterally without masses nor tenderness  No inguinal hernias bilaterally  ALFONSO:  Alfonso catheter inserted without too much difficulty  Please see details under separate procedure note  RADIOLOGY:    none     Assessment:    1  Need for Alfonso catheter placement   2  History of bladder cancer   3   Tight phimosis     Plan:   - follow-up in 4-6 weeks with me for next cath change  -  -  -

## 2025-06-27 NOTE — ASSESSMENT & PLAN NOTE
Telephone call returned to [facility].  They do not have a date and time for mobile dentist visit yet.  She states pt will walk to the med cabinet to indicate he wants more Tylenol.  Advised Yessenia to give him PRN doses regularly until he can be seen by dentist.  Discussed that if for any reason mobile dentist cannot come to let our office know and we will try to get Dr. Campbell out to see him.  He continues to hit the side of his face and jaw area.   The patient has evidence of outlet obstruction based on the appearance of his bladder but he is voiding  He has required catheters in the past   Most recent imaging was ultrasound 3 years ago which did not show evidence of hydronephrosis  He could not void to allow us to check a PVR today  Plan for follow-up in 3 months with an AP to check PVR

## (undated) DEVICE — GLOVE SRG BIOGEL ECLIPSE 7.5

## (undated) DEVICE — PACK TUR

## (undated) DEVICE — STERILE SURGICAL LUBRICANT,  TUBE: Brand: SURGILUBE

## (undated) DEVICE — SKIN MARKER DUAL TIP WITH RULER CAP, FLEXIBLE RULER AND LABELS: Brand: DEVON

## (undated) DEVICE — Device: Brand: OLYMPUS

## (undated) DEVICE — BASIC SINGLE BASIN 2-LF: Brand: MEDLINE INDUSTRIES, INC.

## (undated) DEVICE — GUIDEWIRE STRGHT TIP 0.035 IN  SOLO PLUS

## (undated) DEVICE — SCD SEQUENTIAL COMPRESSION COMFORT SLEEVE MEDIUM KNEE LENGTH: Brand: KENDALL SCD

## (undated) DEVICE — UROCATCH BAG

## (undated) DEVICE — GLOVE INDICATOR PI UNDERGLOVE SZ 7.5 BLUE